# Patient Record
Sex: MALE | Race: BLACK OR AFRICAN AMERICAN | NOT HISPANIC OR LATINO | Employment: STUDENT | URBAN - METROPOLITAN AREA
[De-identification: names, ages, dates, MRNs, and addresses within clinical notes are randomized per-mention and may not be internally consistent; named-entity substitution may affect disease eponyms.]

---
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
## 2023-12-18 ENCOUNTER — EVALUATION (OUTPATIENT)
Dept: PHYSICAL THERAPY | Facility: OTHER | Age: 18
End: 2023-12-18
Payer: COMMERCIAL

## 2023-12-18 DIAGNOSIS — Z98.890 S/P ARTHROSCOPIC RECONSTRUCTION OF ACL OF RIGHT KNEE USING QUADRICEPS TENDON AUTOGRAFT: Primary | ICD-10-CM

## 2023-12-18 DIAGNOSIS — Z87.39 S/P ARTHROSCOPIC RECONSTRUCTION OF ACL OF RIGHT KNEE USING QUADRICEPS TENDON AUTOGRAFT: Primary | ICD-10-CM

## 2023-12-18 PROCEDURE — 97163 PT EVAL HIGH COMPLEX 45 MIN: CPT | Performed by: PHYSICAL THERAPIST

## 2023-12-18 NOTE — LETTER
2023    Tony Snow MD  [address] [facility] [address]    Patient: Ifeanyi Shin Jr.   YOB: 2005   Date of Visit: 2023     Encounter Diagnosis     ICD-10-CM    1. S/P arthroscopic reconstruction of ACL of right knee using quadriceps tendon autograft  Z98.890     Z87.39           Dear Dr. Snow:    Thank you for your recent referral of Ifeanyi Shin Jr.. Please review the attached evaluation summary from Ifeanyi's recent visit.     Please verify that you agree with the plan of care by signing the attached order.     If you have any questions or concerns, please do not hesitate to call.     I sincerely appreciate the opportunity to share in the care of one of your patients and hope to have another opportunity to work with you in the near future.       Sincerely,    Shane Roa, PT      Referring Provider:      I certify that I have read the below Plan of Care and certify the need for these services furnished under this plan of treatment while under my care.                    Tony Snow MD  [address] [facility] [address]  Via Fax: [phone]          PT Evaluation   Today's date: 2023  Patient name: Ifeanyi Shin Jr.  : 2005  MRN: [id number]  Referring provider: Tony Snow MD  Dx:   Encounter Diagnosis     ICD-10-CM    1. S/P arthroscopic reconstruction of ACL of left knee using quadriceps tendon autograft  Z98.890     Z87.39         Start Time: 0900  Stop Time: 1000  Total time in clinic (min): 60 minutes    Assessment  Assessment details: Ifeanyi Shin Jr. is a 18 y.o. male who presents with complaints of  S/P arthroscopic reconstruction of ACL of left knee using quadriceps tendon autograft  (primary encounter diagnosis).  No further referral appears necessary at this time based upon examination results.  Patient presents to PT with impaired strength, impaired ROM, decreased flexibility and impaired ability to complete  IADLs.  Prognosis is good given HEP compliance and PT 2-3x/wk.  Positive prognostic indicators include positive attitude toward recovery.   Please contact me if you have any questions or recommendations.  Thank you for the opportunity to share in  Ifeanyi's care.       Impairments: abnormal coordination, abnormal muscle firing, abnormal or restricted ROM, activity intolerance, impaired physical strength, lacks appropriate home exercise program, pain with function and poor body mechanics    Symptom irritability: moderateBarriers to therapy: Restricted ROM due to scar tissue   Understanding of Dx/Px/POC: good   Prognosis: good    Goals  Short Term:  Pt will report decreased levels of pain by at least 2 subjective ratings in 4 weeks  Pt will demonstrate improved ROM by at least 10 degrees in 4 weeks  Pt will demonstrate improved strength by 1/2 grade  Long Term:   Pt will be independent in their HEP in 8 weeks  Pt will be be pain free with IADL's  Pt will demonstrate improved FOTO, > 80         Plan  Plan details: Prognosis above is given PT services 2x/week tapering to 1x/week over the next 3 months and home program adherence.  Patient would benefit from: skilled physical therapy  Planned modality interventions: thermotherapy: hydrocollator packs, electrical stimulation/Russian stimulation, cryotherapy and low level laser therapy  Planned therapy interventions: activity modification, joint mobilization, manual therapy, motor coordination training, neuromuscular re-education, patient education, self care, therapeutic activities, therapeutic exercise, graded activity, home exercise program, abdominal trunk stabilization, balance, IASTM, flexibility, functional ROM exercises, strengthening and stretching  Frequency: 2x week  Duration in weeks: 12  Plan of Care beginning date: 12/18/2023  Plan of Care expiration date: 3/18/2024  Treatment plan discussed with: patient      Subjective Evaluation    History of Present  "Illness  Mechanism of injury: Patient was playing football when he intercepted a pass and sustained a non-contact knee injury.  He underwent ACL Reconstruction on 10/6/2023.  Patient presents to PT with impaired ROM.  Physician told patient that it is because he has a lot of scar tissue.  He told patient to continue to work on this ROM.  He also told him to find a doctor in the [facility] even though he did his surgery.    Pain   R Knee   Current 0/10  At Best 0/10  At Worst 4/10  Patient described the pain as a tightness accompanied by brief stabbing sensations.    AGGS: stretching   EASES: rest, stretching   Patient's Goal: \"I want to play football in college.\"       Objective     General Comments:      Knee Comments  LQS: WNL     Palpation: unremarkable     Observation: surgical site has healed nicely     ROM   PROM   R Knee WNL   L Knee:   Flexion 95*   Extension 0*     Strength   Iliopsoas L 3-/5 R 3-/5   ER L 3-/5 R 5/5   IR L 3-/5 R 5/5   PGM L 3-/5 R 4/5  Glute Max L 3-/5 R 3-/5   Quads L 3+/5 R 5/5   Hamstrings L 3+/5 R 5/5     Girth Measurements:   L   6 inches above 46 cm   Midline 39 cm   6 inches below 35 cm   R   6 inches above 50 cm   Midline 36 cm   6 inches below 36 cm     Precautions: history of L ACL Reconstruction on 10/6/2023    Daily Treatment Log  Manuals             GISTM              Patellar Mobs             Tibial Glides                           Neuro Re-Ed             NMES QS             NMES TC             BFR                                                                  Ther Ex             Wall Slides              Heel Slides             Bike AAROM              EOT Knee Flexion                                                                 Ther Activity                                       Gait Training                                       Modalities                                                            "

## 2023-12-18 NOTE — PROGRESS NOTES
PT Evaluation   Today's date: 2023  Patient name: Ifeanyi Shin Jr.  : 2005  MRN: [id number]  Referring provider: Tony Snow MD  Dx:   Encounter Diagnosis     ICD-10-CM    1. S/P arthroscopic reconstruction of ACL of left knee using quadriceps tendon autograft  Z98.890     Z87.39         Start Time: 0900  Stop Time: 1000  Total time in clinic (min): 60 minutes    Assessment  Assessment details: Ifeanyi Shin Jr. is a 18 y.o. male who presents with complaints of  S/P arthroscopic reconstruction of ACL of left knee using quadriceps tendon autograft  (primary encounter diagnosis).  No further referral appears necessary at this time based upon examination results.  Patient presents to PT with impaired strength, impaired ROM, decreased flexibility and impaired ability to complete IADLs.  Prognosis is good given HEP compliance and PT 2-3x/wk.  Positive prognostic indicators include positive attitude toward recovery.   Please contact me if you have any questions or recommendations.  Thank you for the opportunity to share in  Ifeanyi's care.       Impairments: abnormal coordination, abnormal muscle firing, abnormal or restricted ROM, activity intolerance, impaired physical strength, lacks appropriate home exercise program, pain with function and poor body mechanics    Symptom irritability: moderateBarriers to therapy: Restricted ROM due to scar tissue   Understanding of Dx/Px/POC: good   Prognosis: good    Goals  Short Term:  Pt will report decreased levels of pain by at least 2 subjective ratings in 4 weeks  Pt will demonstrate improved ROM by at least 10 degrees in 4 weeks  Pt will demonstrate improved strength by 1/2 grade  Long Term:   Pt will be independent in their HEP in 8 weeks  Pt will be be pain free with IADL's  Pt will demonstrate improved FOTO, > 80         Plan  Plan details: Prognosis above is given PT services 2x/week tapering to 1x/week over the next 3 months and home program  "adherence.  Patient would benefit from: skilled physical therapy  Planned modality interventions: thermotherapy: hydrocollator packs, electrical stimulation/Russian stimulation, cryotherapy and low level laser therapy  Planned therapy interventions: activity modification, joint mobilization, manual therapy, motor coordination training, neuromuscular re-education, patient education, self care, therapeutic activities, therapeutic exercise, graded activity, home exercise program, abdominal trunk stabilization, balance, IASTM, flexibility, functional ROM exercises, strengthening and stretching  Frequency: 2x week  Duration in weeks: 12  Plan of Care beginning date: 12/18/2023  Plan of Care expiration date: 3/18/2024  Treatment plan discussed with: patient      Subjective Evaluation    History of Present Illness  Mechanism of injury: Patient was playing football when he intercepted a pass and sustained a non-contact knee injury.  He underwent ACL Reconstruction on 10/6/2023.  Patient presents to PT with impaired ROM.  Physician told patient that it is because he has a lot of scar tissue.  He told patient to continue to work on this ROM.  He also told him to find a doctor in the [facility] even though he did his surgery.    Pain   R Knee   Current 0/10  At Best 0/10  At Worst 4/10  Patient described the pain as a tightness accompanied by brief stabbing sensations.    AGGS: stretching   EASES: rest, stretching   Patient's Goal: \"I want to play football in college.\"       Objective     General Comments:      Knee Comments  LQS: WNL     Palpation: unremarkable     Observation: surgical site has healed nicely     ROM   PROM   R Knee WNL   L Knee:   Flexion 95*   Extension 0*     Strength   Iliopsoas L 3-/5 R 3-/5   ER L 3-/5 R 5/5   IR L 3-/5 R 5/5   PGM L 3-/5 R 4/5  Glute Max L 3-/5 R 3-/5   Quads L 3+/5 R 5/5   Hamstrings L 3+/5 R 5/5     Girth Measurements:   L   6 inches above 46 cm   Midline 39 cm   6 inches below 35 " cm   R   6 inches above 50 cm   Midline 36 cm   6 inches below 36 cm     Precautions: history of L ACL Reconstruction on 10/6/2023    Daily Treatment Log  Manuals             GISTM              Patellar Mobs             Tibial Glides                           Neuro Re-Ed             NMES QS             NMES TC             BFR                                                                  Ther Ex             Wall Slides              Heel Slides             Bike AAROM              EOT Knee Flexion                                                                 Ther Activity                                       Gait Training                                       Modalities

## 2023-12-19 ENCOUNTER — OFFICE VISIT (OUTPATIENT)
Dept: PHYSICAL THERAPY | Facility: OTHER | Age: 18
End: 2023-12-19
Payer: COMMERCIAL

## 2023-12-19 DIAGNOSIS — Z87.39 S/P ARTHROSCOPIC RECONSTRUCTION OF ACL OF RIGHT KNEE USING QUADRICEPS TENDON AUTOGRAFT: Primary | ICD-10-CM

## 2023-12-19 DIAGNOSIS — Z98.890 S/P ARTHROSCOPIC RECONSTRUCTION OF ACL OF RIGHT KNEE USING QUADRICEPS TENDON AUTOGRAFT: Primary | ICD-10-CM

## 2023-12-19 PROCEDURE — 97110 THERAPEUTIC EXERCISES: CPT | Performed by: PHYSICAL THERAPIST

## 2023-12-19 PROCEDURE — 97112 NEUROMUSCULAR REEDUCATION: CPT | Performed by: PHYSICAL THERAPIST

## 2023-12-19 PROCEDURE — 97140 MANUAL THERAPY 1/> REGIONS: CPT | Performed by: PHYSICAL THERAPIST

## 2023-12-20 ENCOUNTER — OFFICE VISIT (OUTPATIENT)
Dept: PHYSICAL THERAPY | Facility: OTHER | Age: 18
End: 2023-12-20
Payer: COMMERCIAL

## 2023-12-20 DIAGNOSIS — Z87.39 S/P ARTHROSCOPIC RECONSTRUCTION OF ACL OF RIGHT KNEE USING QUADRICEPS TENDON AUTOGRAFT: Primary | ICD-10-CM

## 2023-12-20 DIAGNOSIS — Z98.890 S/P ARTHROSCOPIC RECONSTRUCTION OF ACL OF RIGHT KNEE USING QUADRICEPS TENDON AUTOGRAFT: Primary | ICD-10-CM

## 2023-12-20 PROCEDURE — 97110 THERAPEUTIC EXERCISES: CPT | Performed by: PHYSICAL THERAPIST

## 2023-12-20 PROCEDURE — 97112 NEUROMUSCULAR REEDUCATION: CPT | Performed by: PHYSICAL THERAPIST

## 2023-12-20 PROCEDURE — 97140 MANUAL THERAPY 1/> REGIONS: CPT | Performed by: PHYSICAL THERAPIST

## 2023-12-21 ENCOUNTER — APPOINTMENT (OUTPATIENT)
Dept: PHYSICAL THERAPY | Facility: OTHER | Age: 18
End: 2023-12-21
Payer: COMMERCIAL

## 2023-12-23 NOTE — PROGRESS NOTES
"Daily Note   Today's date: 2023  Patient name: Ifeanyi Shin Jr.  : 2005  MRN: [id number]  Referring provider: Tony Snow MD  Dx:   Encounter Diagnosis     ICD-10-CM    1. S/P arthroscopic reconstruction of ACL of left knee using quadriceps tendon autograft  Z98.890     Z87.39         Start Time: 1030  Stop Time: 1130  Total time in clinic (min): 60 minutes    Subjective: Patient tolerated today's session very well.  He said that his knee felt looser after the Initial Evaluation.  Patient reported increased soreness in the knee at the conclusion of today's session.      Objective: See treatment diary below    Assessment: Tolerated treatment well. PT is focusing on ROM.  His ROM was 85*.  At the end of today's session, ROM was 100*.  Patient demonstrated fatigue post treatment, exhibited good technique with therapeutic exercises, and would benefit from continued PT    Plan: Continue per plan of care.     Precautions: ACL Reconstruction performed on 10/6     Daily Treatment Log  Manuals        Tib Glides [facility]       GISTM  [facility]               Neuro Re-Ed         Wall Sits 10 x 10\"        BFR  Future Sessions                                               Ther Ex        Bike  10'        Wall Slides 30x10\"       Wall Slides Weighted 10'   2 KB #25 w/ TB        Heel Prop 10' MHP       EOT Flexion Stretch  10' MHP                                Ther Activity                        Gait Training                        Modalities                             "

## 2023-12-24 NOTE — PROGRESS NOTES
"Daily Note   Today's date: 2023  Patient name: Ifeanyi Shin Jr.  : 2005  MRN: [id number]  Referring provider: Tony Snow MD  Dx:   Encounter Diagnosis     ICD-10-CM    1. S/P arthroscopic reconstruction of ACL of left knee using quadriceps tendon autograft  Z98.890     Z87.39           Start Time: 0845  Stop Time: 930  Total time in clinic (min): 45 minutes    Subjective: Patient tolerated today's session very well.  He told PT his knee was exhausted after today's session.      Objective: See treatment diary below    Assessment: Tolerated treatment well. PT is focusing on ROM.  ROM today was 85* at the beginning of today's session.  It improved to 100* at the end of today's session.  Patient demonstrated fatigue post treatment, exhibited good technique with therapeutic exercises, and would benefit from continued PT    Plan: Continue per plan of care.     Precautions: ACL Reconstruction performed on 10/6     Daily Treatment Log  Manuals       Tib Glides [facility]      GISTM  [facility]      [facility]              Neuro Re-Ed        Wall Sits 10 x 10\"  10x10\"      BFR  Future Sessions Future Sessions                                              Ther Ex       Bike  10'  10'      Wall Slides 30x10\" 30x10\"       Wall Slides Weighted 10'   2 KB #25 w/ TB  10'  2 KB #25 w/ TB      Heel Prop 10' MHP 10' MHP      EOT Flexion Stretch  10' MHP  10' MHP                               Ther Activity                        Gait Training                        Modalities                             "

## 2023-12-26 ENCOUNTER — OFFICE VISIT (OUTPATIENT)
Dept: PHYSICAL THERAPY | Facility: OTHER | Age: 18
End: 2023-12-26

## 2023-12-26 DIAGNOSIS — Z87.39 S/P ARTHROSCOPIC RECONSTRUCTION OF ACL OF RIGHT KNEE USING QUADRICEPS TENDON AUTOGRAFT: Primary | ICD-10-CM

## 2023-12-26 DIAGNOSIS — Z98.890 S/P ARTHROSCOPIC RECONSTRUCTION OF ACL OF RIGHT KNEE USING QUADRICEPS TENDON AUTOGRAFT: Primary | ICD-10-CM

## 2023-12-26 PROCEDURE — 97110 THERAPEUTIC EXERCISES: CPT | Performed by: PHYSICAL THERAPIST

## 2023-12-26 PROCEDURE — 97140 MANUAL THERAPY 1/> REGIONS: CPT | Performed by: PHYSICAL THERAPIST

## 2023-12-27 ENCOUNTER — APPOINTMENT (OUTPATIENT)
Dept: PHYSICAL THERAPY | Facility: OTHER | Age: 18
End: 2023-12-27
Payer: COMMERCIAL

## 2023-12-28 ENCOUNTER — OFFICE VISIT (OUTPATIENT)
Dept: PHYSICAL THERAPY | Facility: OTHER | Age: 18
End: 2023-12-28

## 2023-12-28 DIAGNOSIS — Z98.890 S/P ARTHROSCOPIC RECONSTRUCTION OF ACL OF RIGHT KNEE USING QUADRICEPS TENDON AUTOGRAFT: Primary | ICD-10-CM

## 2023-12-28 DIAGNOSIS — Z87.39 S/P ARTHROSCOPIC RECONSTRUCTION OF ACL OF RIGHT KNEE USING QUADRICEPS TENDON AUTOGRAFT: Primary | ICD-10-CM

## 2023-12-28 PROCEDURE — 97140 MANUAL THERAPY 1/> REGIONS: CPT | Performed by: PHYSICAL THERAPIST

## 2023-12-28 PROCEDURE — 97110 THERAPEUTIC EXERCISES: CPT | Performed by: PHYSICAL THERAPIST

## 2023-12-29 ENCOUNTER — OFFICE VISIT (OUTPATIENT)
Dept: OBGYN CLINIC | Facility: CLINIC | Age: 18
End: 2023-12-29
Payer: COMMERCIAL

## 2023-12-29 ENCOUNTER — APPOINTMENT (OUTPATIENT)
Dept: RADIOLOGY | Age: 18
End: 2023-12-29
Payer: COMMERCIAL

## 2023-12-29 VITALS
SYSTOLIC BLOOD PRESSURE: 115 MMHG | HEART RATE: 88 BPM | DIASTOLIC BLOOD PRESSURE: 75 MMHG | HEIGHT: 70 IN | BODY MASS INDEX: 29.63 KG/M2 | WEIGHT: 207 LBS

## 2023-12-29 DIAGNOSIS — Z98.890 STATUS POST ANTERIOR CRUCIATE LIGAMENT SURGERY: ICD-10-CM

## 2023-12-29 DIAGNOSIS — Z98.890 STATUS POST ANTERIOR CRUCIATE LIGAMENT SURGERY: Primary | ICD-10-CM

## 2023-12-29 PROCEDURE — 73562 X-RAY EXAM OF KNEE 3: CPT

## 2023-12-29 PROCEDURE — 99203 OFFICE O/P NEW LOW 30 MIN: CPT | Performed by: ORTHOPAEDIC SURGERY

## 2023-12-29 NOTE — PROGRESS NOTES
CHIEF COMPLAINT/REASON FOR VISIT  Chief Complaint   Patient presents with    Left Knee - Pain        HISTORY OF PRESENT ILLNESS  Ifeanyi Shin Jr. is a 18 y.o. male who presents for evaluation of his left knee. Patient said on October 6th, 2023 he has ACL and meniscus surgery. Patient said since the surgery he has been struggling to get back his ROM. He said the knee feels stiff and tight. He said the farthest he can get is 90 degrees. Mom said patient has a limp when he walks. Patient reports some mild pain when walking. He stopped using a brace to walk on 12/14/23. He denies any instability in the joint. He ha been working with PT. 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    REVIEW OF SYSTEMS  Review of systems was performed and, outside that mentioned in the HPI, it was negative for symptomology related to the integumentary, hematologic, immunologic, allergic, neurologic, cardiovascular, respiratory, GI or  systems.    MEDICAL HISTORY  Patient Active Problem List   Diagnosis    Exposure to COVID-19 virus       SURGICAL HISTORY  History reviewed. No pertinent surgical history.    CURRENT MEDICATIONS    Current Outpatient Medications:     ibuprofen (MOTRIN) 400 mg tablet, Take 1 tablet (400 mg total) by mouth every 6 (six) hours as needed for mild pain for up to 3 days, Disp: 12 tablet, Rfl: 0    SOCIAL HISTORY  Social History     Socioeconomic History    Marital status: Single     Spouse name: Not on file    Number of children: Not on file    Years of education: Not on file    Highest education level: Not on file   Occupational History    Not on file   Tobacco Use    Smoking status: Never    Smokeless tobacco: Never   Vaping Use    Vaping status: Never Used   Substance and Sexual Activity    Alcohol use: Not Currently    Drug use: Not Currently    Sexual activity: Not Currently   Other Topics Concern    Not on file   Social History Narrative    Not on file     Social Determinants of Health     Financial  "Resource Strain: Not on file   Food Insecurity: Not on file   Transportation Needs: Not on file   Physical Activity: Not on file   Stress: Not on file   Social Connections: Not on file   Intimate Partner Violence: Not on file   Housing Stability: Not on file       Objective     VITAL SIGNS  /75 (BP Location: Left arm, Patient Position: Sitting, Cuff Size: Adult)   Pulse 88   Ht 5' 10\" (1.778 m) Comment: verbal  Wt 93.9 kg (207 lb)   BMI 29.70 kg/m²      PHYSICAL EXAMINATION    Musculoskeletal: Left Knee Examination:  General: The patient is alert, oriented, and pleasant to interact with.  Patient ambulates with Normal and Antalgic gait pattern  Assistive Device: No  Alignment: normal  Skin is warm and dry to touch with no signs of erythema, ecchymosis, or infection   Effusion: minimal  ROM: 0° - 90°  verus 0° - 135° on contralateral side  MMT: 5/5 throughout  TTP: None  Flexor and extensor mechanisms are intact   Knee is stable to varus and valgus stress  Du's Test Negative    Lachman's Test - 1A  Quadriceps atrophy on left quadriceps  2+ DP and PT pulses with brisk capillary refill to the toes  Sural, saphenous, tibial, superficial, and deep peroneal motor and sensory distributions intact  Sensation light touch intact distally    RADIOGRAPHIC EXAMINATION/DIAGNOSTICS:  No results found.    ASSESSMENT/PLAN:  Left knee stiffness status post ACL surgery on 10/6/23    Today, we discussed recovery period from ACL surgery. We discussed he appears to be recovering slightly slower but on track since the surgery. We discussed conservative treatment options including but are certainly not limited to: Rest, ice, compression, elevation, activity modification, anti-inflammatory medication, physical therapy, and/or injections.  We discussed benefits of each potential treatment option.  After discussion, patient was agreeable to trying Rest, Ice, Compression, Elevation, Activity Modification, and Anti-inflammatory " Medication. We discussed we would not recommend any surgical manipulations at this time. We will plan to follow up with the patient in 6 weeks.  They are understanding that if the pain should worsen or they develop new symptoms to please reach out to us sooner. Patient is understanding and agreeable to this plan.           Scribe Attestation      I,:  Bong Han PA-C am acting as a scribe while in the presence of the attending physician.:       I,:  Deo Alcantar MD personally performed the services described in this documentation    as scribed in my presence.:

## 2023-12-30 NOTE — PROGRESS NOTES
"Daily Note   Today's date: 2023  Patient name: Ifeanyi Shin Jr.  : 2005  MRN: [id number]  Referring provider: Tony Snow MD  Dx:   Encounter Diagnosis     ICD-10-CM    1. S/P arthroscopic reconstruction of ACL of left knee using quadriceps tendon autograft  Z98.890     Z87.39           Start Time: 1500  Stop Time: 1600  Total time in clinic (min): 60 minutes    Subjective: Patient tolerated today's session very well.  He told PT his knee was exhausted after today's session.      Objective: See treatment diary below    Assessment: Tolerated treatment well. PT's focus continues to be ROM.  Patient demonstrated fatigue post treatment, exhibited good technique with therapeutic exercises, and would benefit from continued PT    Plan: Continue per plan of care.     Precautions: ACL Reconstruction performed on 10/6     Daily Treatment Log  Manuals      Tib Glides [facility]     GISTM  [facility]             Neuro Re-Ed       Wall Sits 10 x 10\"  10x10\" 10x10\"      BFR  Future Sessions Future Sessions Future Sessions                                             Ther Ex      Bike  10'  10' 10'     Wall Slides 30x10\" 30x10\"  30x10\"      Wall Slides Weighted 10'   2 KB #25 w/ TB  10'  2 KB #25 w/ TB 10'   2KB #25 w/ TB     Heel Prop 10' MHP 10' MHP 10' MHP      EOT Flexion Stretch  10' MHP  10' MHP  10' MHP                              Ther Activity                        Gait Training                        Modalities                             "

## 2024-01-02 ENCOUNTER — OFFICE VISIT (OUTPATIENT)
Dept: PHYSICAL THERAPY | Facility: OTHER | Age: 19
End: 2024-01-02
Payer: COMMERCIAL

## 2024-01-02 DIAGNOSIS — Z98.890 S/P ARTHROSCOPIC RECONSTRUCTION OF ACL OF RIGHT KNEE USING QUADRICEPS TENDON AUTOGRAFT: Primary | ICD-10-CM

## 2024-01-02 DIAGNOSIS — Z87.39 S/P ARTHROSCOPIC RECONSTRUCTION OF ACL OF RIGHT KNEE USING QUADRICEPS TENDON AUTOGRAFT: Primary | ICD-10-CM

## 2024-01-02 PROCEDURE — 97110 THERAPEUTIC EXERCISES: CPT | Performed by: PHYSICAL THERAPIST

## 2024-01-02 PROCEDURE — 97112 NEUROMUSCULAR REEDUCATION: CPT | Performed by: PHYSICAL THERAPIST

## 2024-01-02 PROCEDURE — 97140 MANUAL THERAPY 1/> REGIONS: CPT | Performed by: PHYSICAL THERAPIST

## 2024-01-03 ENCOUNTER — OFFICE VISIT (OUTPATIENT)
Dept: PHYSICAL THERAPY | Facility: OTHER | Age: 19
End: 2024-01-03
Payer: COMMERCIAL

## 2024-01-03 DIAGNOSIS — Z98.890 S/P ARTHROSCOPIC RECONSTRUCTION OF ACL OF RIGHT KNEE USING QUADRICEPS TENDON AUTOGRAFT: Primary | ICD-10-CM

## 2024-01-03 DIAGNOSIS — Z87.39 S/P ARTHROSCOPIC RECONSTRUCTION OF ACL OF RIGHT KNEE USING QUADRICEPS TENDON AUTOGRAFT: Primary | ICD-10-CM

## 2024-01-03 PROCEDURE — 97140 MANUAL THERAPY 1/> REGIONS: CPT | Performed by: PHYSICAL THERAPIST

## 2024-01-03 NOTE — PROGRESS NOTES
"Daily Note   Today's date: 2024  Patient name: Ifeanyi Shin Jr.  : 2005  MRN: [id number]  Referring provider: Tony Snow MD  Dx:   Encounter Diagnosis     ICD-10-CM    1. S/P arthroscopic reconstruction of ACL of left knee using quadriceps tendon autograft  Z98.890     Z87.39         1 on 1 [id number]  IEP [id number]  Start Time: 1530  Stop Time: 1615  Total time in clinic (min): 45 minutes    Subjective: Patient tolerated today's session very well.  He was exhausted at the conclusion of today's session.  Patient said that his physician told him to continue with PT at this time and that it is a little early to start thinking manipulation.  He said that it is still in the realm of possibilities.      Objective: See treatment diary below    Assessment: Tolerated treatment well. PT's focus continues to be ROM.  Patient demonstrated fatigue post treatment, exhibited good technique with therapeutic exercises, and would benefit from continued PT    Plan: Continue per plan of care.     Precautions: ACL Reconstruction performed on 10/6     Daily Treatment Log  Manuals    Tib Glides [facility]   GISTM  [facility]   MFDc  [facility]           Neuro Re-Ed   1   Wall Sits 10 x 10\"  10x10\" 10x10\"  10 x 10\"  10 x 10\"   BFR  Future Sessions Future Sessions Future Sessions Future Sessions Future Sessions                                           Ther Ex  12   Bike  10'  10' 10' 10'  10'   Wall Slides 30x10\" 30x10\"  30x10\"  30x10\" 30x10\"   Wall Slides Weighted 10'   2 KB #25 w/ TB  10'  2 KB #25 w/ TB 10'   2KB #25 w/ TB 10'  2KB #25 w/ TB 10'   2KB #25 w/ TB   Heel Prop 10' MHP 10' MHP 10' MHP  10' MHP 10' MHP    EOT Flexion Stretch  10' MHP  10' MHP  10' MHP  10' MHP 10' MHP                            Ther Activity                        Gait Training                        Modalities                         "

## 2024-01-04 ENCOUNTER — OFFICE VISIT (OUTPATIENT)
Dept: PHYSICAL THERAPY | Facility: OTHER | Age: 19
End: 2024-01-04
Payer: COMMERCIAL

## 2024-01-04 DIAGNOSIS — Z87.39 S/P ARTHROSCOPIC RECONSTRUCTION OF ACL OF RIGHT KNEE USING QUADRICEPS TENDON AUTOGRAFT: Primary | ICD-10-CM

## 2024-01-04 DIAGNOSIS — Z98.890 S/P ARTHROSCOPIC RECONSTRUCTION OF ACL OF RIGHT KNEE USING QUADRICEPS TENDON AUTOGRAFT: Primary | ICD-10-CM

## 2024-01-04 PROCEDURE — 97140 MANUAL THERAPY 1/> REGIONS: CPT | Performed by: PHYSICAL THERAPIST

## 2024-01-04 PROCEDURE — 97110 THERAPEUTIC EXERCISES: CPT | Performed by: PHYSICAL THERAPIST

## 2024-01-04 PROCEDURE — 97112 NEUROMUSCULAR REEDUCATION: CPT | Performed by: PHYSICAL THERAPIST

## 2024-01-05 NOTE — PROGRESS NOTES
"Daily Note   Today's date: 1/3/2024  Patient name: Ifeanyi Shin Jr.  : 2005  MRN: [id number]  Referring provider: Tony Snow MD  Dx:   Encounter Diagnosis     ICD-10-CM    1. S/P arthroscopic reconstruction of ACL of left knee using quadriceps tendon autograft  Z98.890     Z87.39         IEP [id number]  1 on 1 [id number]  IEP 5980-4648  Start Time: 1300  Stop Time: 1400  Total time in clinic (min): 60 minutes    Subjective: Patient tolerated today's session very well.  He said that the knee feels looser at the end of today's session.    Objective: See treatment diary below    Assessment: Tolerated treatment well. PT's focus continues to be ROM.  PT added a couple of new exercises to strengthen his core.  Patient demonstrated fatigue post treatment, exhibited good technique with therapeutic exercises, and would benefit from continued PT    Plan: Continue per plan of care.     Precautions: ACL Reconstruction performed on 10/6     Daily Treatment Log  Manuals 12/19 12/20 12/26 12/28 1/2 1/3   Tib Glides [facility]   [facility]M  [facility]   [facility]            Neuro Re-Ed  12/19 12/20 12/26 12/28 1/2 1/3   Wall Sits 10 x 10\"  10x10\" 10x10\"  10 x 10\"  10 x 10\" 10 x 10\"    BFR  Future Sessions Future Sessions Future Sessions Future Sessions Future Sessions    Pball Crushers      20 x 5\"    Alt UE/Alt LE                                    Ther Ex 12/19 12/20 12/26 12/28 1/2 1/3   Bike  10'  10' 10' 10'  10' 10'    Wall Slides 30x10\" 30x10\"  30x10\"  30x10\" 30x10\" 30 x 10\"    Wall Slides Weighted 10'   2 KB #25 w/ TB  10'  2 KB #25 w/ TB 10'   2KB #25 w/ TB 10'  2KB #25 w/ TB 10'   2KB #25 w/ TB 10'   2KB #25 w/ TB   Heel Prop 10' MHP 10' MHP 10' MHP  10' MHP 10' MHP  10' MHP   EOT Flexion Stretch  10' MHP  10' MHP  10' MHP  10' MHP 10' MHP  10' MHP                              Ther Activity                           Gait Training                           Modalities "

## 2024-01-06 NOTE — PROGRESS NOTES
"Daily Note   Today's date: 2024  Patient name: Ifeanyi Shin Jr.  : 2005  MRN: [id number]  Referring provider: Tony Snow MD  Dx:   Encounter Diagnosis     ICD-10-CM    1. S/P arthroscopic reconstruction of ACL of left knee using quadriceps tendon autograft  Z98.890     Z87.39         IEP [id number]  1 on 1 0194-0544  IEP 0560-4394  Start Time: 1115  Stop Time: 1215  Total time in clinic (min): 60 minutes    Subjective: Patient tolerated today's session very well.  He reported decreased tightness after heat and stretching.      Objective: See treatment diary below    Assessment: Tolerated treatment well. PT's focus continues to be ROM.  PT added several new core exercises.  PT also emphasized importance of doing exercises at home.  Patient demonstrated fatigue post treatment, exhibited good technique with therapeutic exercises, and would benefit from continued PT    Plan: Continue per plan of care.     Precautions: ACL Reconstruction performed on 10/6     Daily Treatment Log  Manuals 12/26 12/28 1/2 1/3 1/4   Tib Glides [facility]   GISTM  [facility]   [facility]   PROM     [facility]   Neuro Re-Ed  12/26 12/28 1/2 1/3 1/4   Wall Sits 10x10\"  10 x 10\"  10 x 10\" 10 x 10\"  10 x 10\"    BFR  Future Sessions Future Sessions Future Sessions     Pball Crushers    20 x 5\"  20 x 5\"    Alt UE/Alt LE     20 x 5\" ea  #2AW/#2   Clamshells     #5AW   20 x 5\" ea                   Ther Ex 12/26 12/28 1/2 1/3 1/4   Bike  10' 10'  10' 10'  10'    Wall Slides 30x10\"  30x10\" 30x10\" 30 x 10\"  30x10\"    Wall Slides Weighted 10'   2KB #25 w/ TB 10'  2KB #25 w/ TB 10'   2KB #25 w/ TB 10'   2KB #25 w/ TB 10'   2KB #25 w/ TB   Heel Prop 10' MHP  10' MHP 10' MHP  10' MHP 10' MHP   EOT Flexion Stretch  10' MHP  10' MHP 10' MHP  10' MHP 10' MHP                           Ther Activity                        Gait Training                        Modalities                             "

## 2024-01-08 ENCOUNTER — OFFICE VISIT (OUTPATIENT)
Dept: PHYSICAL THERAPY | Facility: OTHER | Age: 19
End: 2024-01-08
Payer: COMMERCIAL

## 2024-01-08 DIAGNOSIS — Z87.39 S/P ARTHROSCOPIC RECONSTRUCTION OF ACL OF RIGHT KNEE USING QUADRICEPS TENDON AUTOGRAFT: Primary | ICD-10-CM

## 2024-01-08 DIAGNOSIS — Z98.890 S/P ARTHROSCOPIC RECONSTRUCTION OF ACL OF RIGHT KNEE USING QUADRICEPS TENDON AUTOGRAFT: Primary | ICD-10-CM

## 2024-01-08 PROCEDURE — 97110 THERAPEUTIC EXERCISES: CPT | Performed by: PHYSICAL THERAPIST

## 2024-01-08 PROCEDURE — 97112 NEUROMUSCULAR REEDUCATION: CPT | Performed by: PHYSICAL THERAPIST

## 2024-01-08 PROCEDURE — 97140 MANUAL THERAPY 1/> REGIONS: CPT | Performed by: PHYSICAL THERAPIST

## 2024-01-08 NOTE — PROGRESS NOTES
"Daily Note   Today's date: 2024  Patient name: Ifeanyi Shin Jr.  : 2005  MRN: [id number]  Referring provider: Tony Snow MD  Dx:   Encounter Diagnosis     ICD-10-CM    1. S/P arthroscopic reconstruction of ACL of left knee using quadriceps tendon autograft  Z98.890     Z87.39         1 on 1 [id number]  IEP [id number]  Start Time: 1400  Stop Time: 1500  Total time in clinic (min): 60 minutes    Subjective: Patient tolerated today's session very well.  PT is reaching out to MD about Dynasplint.      Objective: See treatment diary below    Assessment: Tolerated treatment well.  NV try BFR.  Patient demonstrated fatigue post treatment, exhibited good technique with therapeutic exercises, and would benefit from continued PT    Plan: Continue per plan of care.     Precautions: ACL Reconstruction performed on 10/6     Daily Treatment Log  Manuals 12/26 12/28 1/2 1/3 1/4 1/8   Tib Glides [facility]   GISTM  [facility]   [facility]  [facility]   PROM     [facility]   Neuro Re-Ed  12/26 12/28 1/2 1/3 1/4 1/8   Wall Sits 10x10\"  10 x 10\"  10 x 10\" 10 x 10\"  10 x 10\"  10 x 10\"    BFR  Future Sessions Future Sessions Future Sessions   NV    Pball Crushers    20 x 5\"  20 x 5\"  30 x 5\"    Alt UE/Alt LE     20 x 5\" ea  #2AW/#2 20 x 5\" ea   #2AW/#2    Clamshells     #5AW   20 x 5\" ea #5AW   30x5\" ea                     Ther Ex 12/26 12/28 1/2 1/3 1/4 1/8   Bike  10' 10'  10' 10'  10'  10'    Wall Slides 30x10\"  30x10\" 30x10\" 30 x 10\"  30x10\"  30x10\"    Wall Slides Weighted 10'   2KB #25 w/ TB 10'  2KB #25 w/ TB 10'   2KB #25 w/ TB 10'   2KB #25 w/ TB 10'   2KB #25 w/ TB 10'   2KB #25 w/ TB   Heel Prop 10' MHP  10' MHP 10' MHP  10' MHP 10' MHP 10' MHP   EOT Flexion Stretch  10' MHP  10' MHP 10' MHP  10' MHP 10' MHP 10' MHP    Double Leg Press      #75  3x10                      Ther Activity                           Gait Training                           Modalities                   "

## 2024-01-10 ENCOUNTER — OFFICE VISIT (OUTPATIENT)
Dept: PHYSICAL THERAPY | Facility: OTHER | Age: 19
End: 2024-01-10
Payer: COMMERCIAL

## 2024-01-10 DIAGNOSIS — Z98.890 S/P ARTHROSCOPIC RECONSTRUCTION OF ACL OF RIGHT KNEE USING QUADRICEPS TENDON AUTOGRAFT: Primary | ICD-10-CM

## 2024-01-10 DIAGNOSIS — Z87.39 S/P ARTHROSCOPIC RECONSTRUCTION OF ACL OF RIGHT KNEE USING QUADRICEPS TENDON AUTOGRAFT: Primary | ICD-10-CM

## 2024-01-10 PROCEDURE — 97112 NEUROMUSCULAR REEDUCATION: CPT | Performed by: PHYSICAL THERAPIST

## 2024-01-10 PROCEDURE — 97110 THERAPEUTIC EXERCISES: CPT | Performed by: PHYSICAL THERAPIST

## 2024-01-10 PROCEDURE — 97140 MANUAL THERAPY 1/> REGIONS: CPT | Performed by: PHYSICAL THERAPIST

## 2024-01-11 ENCOUNTER — OFFICE VISIT (OUTPATIENT)
Dept: PHYSICAL THERAPY | Facility: OTHER | Age: 19
End: 2024-01-11
Payer: COMMERCIAL

## 2024-01-11 DIAGNOSIS — Z87.39 S/P ARTHROSCOPIC RECONSTRUCTION OF ACL OF RIGHT KNEE USING QUADRICEPS TENDON AUTOGRAFT: Primary | ICD-10-CM

## 2024-01-11 DIAGNOSIS — Z98.890 S/P ARTHROSCOPIC RECONSTRUCTION OF ACL OF RIGHT KNEE USING QUADRICEPS TENDON AUTOGRAFT: Primary | ICD-10-CM

## 2024-01-11 PROCEDURE — 97110 THERAPEUTIC EXERCISES: CPT | Performed by: PHYSICAL THERAPIST

## 2024-01-11 PROCEDURE — 97140 MANUAL THERAPY 1/> REGIONS: CPT | Performed by: PHYSICAL THERAPIST

## 2024-01-11 NOTE — PROGRESS NOTES
"Daily Note   Today's date: 1/10/2024  Patient name: Ifeanyi Shin Jr.  : 2005  MRN: [id number]  Referring provider: Tony Snow MD  Dx:   Encounter Diagnosis     ICD-10-CM    1. S/P arthroscopic reconstruction of ACL of left knee using quadriceps tendon autograft  Z98.890     Z87.39         IEP [id number]  1 on 1 entire duration  Start Time: 1330  Stop Time: 1430  Total time in clinic (min): 60 minutes    Subjective: Patient tolerated today's session very well.  PT reached out to Dr Alcantar.  Awaiting response at this time.      Objective: See treatment diary below    Assessment: Tolerated treatment well.  NV try BFR.  Patient demonstrated fatigue post treatment, exhibited good technique with therapeutic exercises, and would benefit from continued PT    Plan: Continue per plan of care.     Precautions: ACL Reconstruction performed on 10/6     Daily Treatment Log  Manuals 1/2 1/3 1/4 1/8 1/10   Tib Glides [facility]   GISTM  [facility]   MFDc  [facility]   PROM   [facility]   Neuro Re-Ed  1/2 1/3 1/4 1/8 1/10   Wall Sits 10 x 10\" 10 x 10\"  10 x 10\"  10 x 10\"  10 x 10\"    BFR  Future Sessions   NV  NV   Pball Crushers  20 x 5\"  20 x 5\"  30 x 5\"  30x5\"    Alt UE/Alt LE   20 x 5\" ea  #2AW/#2 20 x 5\" ea   #2AW/#2     Clamshells   #5AW   20 x 5\" ea #5AW   30x5\" ea #5AW  30x5\" ea   Heel Raised Wall Squats     10 x 10\"            Ther Ex 1/2 1/3 1/4 1/8 1/10   Bike  10' 10'  10'  10'  10'    Wall Slides 30x10\" 30 x 10\"  30x10\"  30x10\"  30 x 10\"    Wall Slides Weighted 10'   2KB #25 w/ TB 10'   2KB #25 w/ TB 10'   2KB #25 w/ TB 10'   2KB #25 w/ TB    Heel Prop 10' MHP  10' MHP 10' MHP 10' MHP 10' MHP   EOT Flexion Stretch  10' MHP  10' MHP 10' MHP 10' MHP  10' MHP    Double Leg Press    #75  3x10  #75   3x10   Couch Stretch     10 x 10\"            Ther Activity                        Gait Training                        Modalities                          "

## 2024-01-13 NOTE — PROGRESS NOTES
"Daily Note   Today's date: 2024  Patient name: Ifeanyi Shin Jr.  : 2005  MRN: [id number]  Referring provider: Tony Snow MD  Dx:   Encounter Diagnosis     ICD-10-CM    1. S/P arthroscopic reconstruction of ACL of left knee using quadriceps tendon autograft  Z98.890     Z87.39         IEP [id number]  1 on 1 8002-4343  Start Time: 1330  Stop Time: 1415  Total time in clinic (min): 45 minutes    Subjective: Patient tolerated today's session very well.  He reported his knee felt looser.       Objective: See treatment diary below    Assessment: Tolerated treatment well.  PT did not try BFR today; NV try BFR.  Patient demonstrated fatigue post treatment, exhibited good technique with therapeutic exercises, and would benefit from continued PT    Plan: Continue per plan of care.     Precautions: ACL Reconstruction performed on 10/6     Daily Treatment Log  Manuals 1/2 1/3 1/4 1/8 1/10 1/11   Tib Glides [facility]   GISTM  [facility]   MFDc  [facility]   PROM   [facility]   Neuro Re-Ed  1/2 1/3 1/4 1/8 1/10 1/11   Wall Sits 10 x 10\" 10 x 10\"  10 x 10\"  10 x 10\"  10 x 10\"  10 x 10\"    BFR  Future Sessions   NV  NV NV   Pball Crushers  20 x 5\"  20 x 5\"  30 x 5\"  30x5\"  30x5\"    Alt UE/Alt LE   20 x 5\" ea  #2AW/#2 20 x 5\" ea   #2AW/#2   20x5\" ea  #2AW/#2   Clamshells   #5AW   20 x 5\" ea #5AW   30x5\" ea #5AW  30x5\" ea #5AW  30x5\" ea   Heel Raised Wall Squats     10 x 10\"  10 x 10\"             Ther Ex 1/2 1/3 1/4 1/8 1/10 1/10   Bike  10' 10'  10'  10'  10'  10'    Wall Slides 30x10\" 30 x 10\"  30x10\"  30x10\"  30 x 10\"  30x10\"    Wall Slides Weighted 10'   2KB #25 w/ TB 10'   2KB #25 w/ TB 10'   2KB #25 w/ TB 10'   2KB #25 w/ TB     Heel Prop 10' MHP  10' MHP 10' MHP 10' MHP 10' MHP 10' MHP    EOT Flexion Stretch  10' MHP  10' MHP 10' MHP 10' MHP  10' MHP  10' MHP   Double Leg Press    #75  3x10  #75   3x10 #75  3x10   Couch Stretch     10 x 10\"  10 x 10\"           "   Ther Activity                           Gait Training                           Modalities

## 2024-01-15 ENCOUNTER — OFFICE VISIT (OUTPATIENT)
Dept: PHYSICAL THERAPY | Facility: OTHER | Age: 19
End: 2024-01-15
Payer: COMMERCIAL

## 2024-01-15 DIAGNOSIS — Z98.890 S/P ARTHROSCOPIC RECONSTRUCTION OF ACL OF RIGHT KNEE USING QUADRICEPS TENDON AUTOGRAFT: Primary | ICD-10-CM

## 2024-01-15 DIAGNOSIS — Z87.39 S/P ARTHROSCOPIC RECONSTRUCTION OF ACL OF RIGHT KNEE USING QUADRICEPS TENDON AUTOGRAFT: Primary | ICD-10-CM

## 2024-01-15 PROCEDURE — 97110 THERAPEUTIC EXERCISES: CPT | Performed by: PHYSICAL THERAPIST

## 2024-01-15 PROCEDURE — 97112 NEUROMUSCULAR REEDUCATION: CPT | Performed by: PHYSICAL THERAPIST

## 2024-01-15 PROCEDURE — 97140 MANUAL THERAPY 1/> REGIONS: CPT | Performed by: PHYSICAL THERAPIST

## 2024-01-16 NOTE — PROGRESS NOTES
"Daily Note   Today's date: 1/15/2024  Patient name: Ifeanyi Shin Jr.  : 2005  MRN: [id number]  Referring provider: Tony Snow MD  Dx:   Encounter Diagnosis     ICD-10-CM    1. S/P arthroscopic reconstruction of ACL of left knee using quadriceps tendon autograft  Z98.890     Z87.39         IEP 4941-9730  1 on 1 8756-1579  Start Time: 1530  Stop Time: 1630  Total time in clinic (min): 60 minutes    Subjective: Patient tolerated today's session very well.  He reported his knee felt looser.       Objective: See treatment diary below    Assessment: Tolerated treatment well.  PT did not try BFR today.  PT is going to do it next session.  Add BFR next visit.  demonstrated fatigue post treatment, exhibited good technique with therapeutic exercises, and would benefit from continued PT    Plan: Continue per plan of care.     Precautions: ACL Reconstruction performed on 10/6     Daily Treatment Log  Manuals 1/4 1/8 1/10 1/11 1/15   Tib Glides [facility]   GISTM  [facility]   [facility]   PROM [facility]   Neuro Re-Ed  1/4 1/8 1/10 1/11 1/15   Wall Sits 10 x 10\"  10 x 10\"  10 x 10\"  10 x 10\"     BFR   NV  NV NV    Pball Crushers 20 x 5\"  30 x 5\"  30x5\"  30x5\"  30x5\"    Alt UE/Alt LE 20 x 5\" ea  #2AW/#2 20 x 5\" ea   #2AW/#2   20x5\" ea  #2AW/#2 20x5\" ea  #2AW/#2   Clamshells #5AW   20 x 5\" ea #5AW   30x5\" ea #5AW  30x5\" ea #5AW  30x5\" ea #5AW  30x5\" ea   Heel Raised Wall Squats   10 x 10\"  10 x 10\"  10 x 45\"            Ther Ex 1/4 1/8 1/10 1/10 1/15   Bike  10'  10'  10'  10'  10'    Wall Slides 30x10\"  30x10\"  30 x 10\"  30x10\"  10' MHP   Wall Slides Weighted 10'   2KB #25 w/ TB 10'   2KB #25 w/ TB      Heel Prop 10' MHP 10' MHP 10' MHP 10' MHP     EOT Flexion Stretch  10' MHP 10' MHP  10' MHP  10' MHP    Double Leg Press  #75  3x10  #75   3x10 #75  3x10 #75   3 x 10    Couch Stretch   10 x 10\"  10 x 10\"  10 x 10\"            Ther Activity                        Gait " Training                        Modalities

## 2024-01-17 ENCOUNTER — OFFICE VISIT (OUTPATIENT)
Dept: PHYSICAL THERAPY | Facility: OTHER | Age: 19
End: 2024-01-17
Payer: COMMERCIAL

## 2024-01-17 DIAGNOSIS — Z98.890 S/P ARTHROSCOPIC RECONSTRUCTION OF ACL OF RIGHT KNEE USING QUADRICEPS TENDON AUTOGRAFT: Primary | ICD-10-CM

## 2024-01-17 DIAGNOSIS — Z87.39 S/P ARTHROSCOPIC RECONSTRUCTION OF ACL OF RIGHT KNEE USING QUADRICEPS TENDON AUTOGRAFT: Primary | ICD-10-CM

## 2024-01-17 PROCEDURE — 97110 THERAPEUTIC EXERCISES: CPT | Performed by: PHYSICAL THERAPIST

## 2024-01-17 PROCEDURE — 97112 NEUROMUSCULAR REEDUCATION: CPT | Performed by: PHYSICAL THERAPIST

## 2024-01-17 PROCEDURE — 97140 MANUAL THERAPY 1/> REGIONS: CPT | Performed by: PHYSICAL THERAPIST

## 2024-01-17 NOTE — PROGRESS NOTES
"Daily Note   Today's date: 24  Patient name: Ifeanyi Shin Jr.  : 2005  MRN: [id number]  Referring provider: Tony Snow MD  Dx:   Encounter Diagnosis     ICD-10-CM    1. S/P arthroscopic reconstruction of ACL of left knee using quadriceps tendon autograft  Z98.890     Z87.39         Start Time: 1130  Stop Time: 1230  Total time in clinic (min): 60 minutes    Subjective: Patient tolerated today's session very well.  He reported his knee felt looser at the end of today's session.        Objective: See treatment diary below    Assessment: Tolerated treatment well.  PT did not try BFR today.  PT is going to do it next session.  Add BFR next visit.  PT was able to get him to 98* of flexion today.  Patient demonstrated fatigue post treatment, exhibited good technique with therapeutic exercises, and would benefit from continued PT    Plan: Continue per plan of care.     Precautions: ACL Reconstruction performed on 10/6     Daily Treatment Log  Manuals 1/10 1/11 1/15 1/17   Tib Glides [facility]   GISTM  [facility]   [facility]   PROM [facility]   Neuro Re-Ed  1/10 1/11 1/15 1/17   Wall Sits 10 x 10\"  10 x 10\"      BFR  NV NV     Pball Crushers 30x5\"  30x5\"  30x5\"     Alt UE/Alt LE  20x5\" ea  #2AW/#2 20x5\" ea  #2AW/#2    Clamshells #5AW  30x5\" ea #5AW  30x5\" ea #5AW  30x5\" ea    Heel Raised Wall Squats 10 x 10\"  10 x 10\"  10 x 45\"  10 x 45\"   TKE [place]    #30 3x10x5\" ea          Ther Ex 1/10 1/10 1/15 1/17   Bike  10'  10'  10'  10'    Wall Slides 30 x 10\"  30x10\"  10' MHP 10' MHP   Wall Slides Weighted    #10KB  MHP 10'    Heel Prop 10' MHP 10' MHP      EOT Flexion Stretch  10' MHP  10' MHP     Double Leg Press #75   3x10 #75  3x10 #75   3 x 10  #75  3 x 10   Couch Stretch 10 x 10\"  10 x 10\"  10 x 10\"  10 x 10\"           Ther Activity                     Gait Training                     Modalities                       "

## 2024-01-18 ENCOUNTER — OFFICE VISIT (OUTPATIENT)
Dept: PHYSICAL THERAPY | Facility: OTHER | Age: 19
End: 2024-01-18
Payer: COMMERCIAL

## 2024-01-18 DIAGNOSIS — Z87.39 S/P ARTHROSCOPIC RECONSTRUCTION OF ACL OF RIGHT KNEE USING QUADRICEPS TENDON AUTOGRAFT: Primary | ICD-10-CM

## 2024-01-18 DIAGNOSIS — Z98.890 S/P ARTHROSCOPIC RECONSTRUCTION OF ACL OF RIGHT KNEE USING QUADRICEPS TENDON AUTOGRAFT: Primary | ICD-10-CM

## 2024-01-18 PROCEDURE — 97140 MANUAL THERAPY 1/> REGIONS: CPT | Performed by: PHYSICAL THERAPIST

## 2024-01-21 NOTE — PROGRESS NOTES
"Daily Note   Today's date: 24  Patient name: Ifeanyi Shin Jr.  : 2005  MRN: [id number]  Referring provider: Tony Snow MD  Dx:   Encounter Diagnosis     ICD-10-CM    1. S/P arthroscopic reconstruction of ACL of left knee using quadriceps tendon autograft  Z98.890     Z87.39                    Subjective: Patient tolerated today's session very well.  He reported his knee felt looser at the end of today's session.        Objective: See treatment diary below    Assessment: Tolerated treatment well.  PT did not try BFR today.  PT is going to do it next session.  Add BFR next visit.  PT was able to get him to 98* of flexion today.  Patient demonstrated fatigue post treatment, exhibited good technique with therapeutic exercises, and would benefit from continued PT    Plan: Continue per plan of care.     Precautions: ACL Reconstruction performed on 10/6     Daily Treatment Log  Manuals 1/10 1/11 1/15 1/17   Tib Glides [facility]   [facility]ISTM  [facility]   [facility]   PROM [facility]   Neuro Re-Ed  1/10 1/11 1/15 1/17   Wall Sits 10 x 10\"  10 x 10\"      BFR  NV NV     Pball Crushers 30x5\"  30x5\"  30x5\"     Alt UE/Alt LE  20x5\" ea  #2AW/#2 20x5\" ea  #2AW/#2    Clamshells #5AW  30x5\" ea #5AW  30x5\" ea #5AW  30x5\" ea    Heel Raised Wall Squats 10 x 10\"  10 x 10\"  10 x 45\"  10 x 45\"   TKE [place]    #30 3x10x5\" ea          Ther Ex 1/10 1/10 1/15 1/17   Bike  10'  10'  10'  10'    Wall Slides 30 x 10\"  30x10\"  10' MHP 10' MHP   Wall Slides Weighted    #10KB  MHP 10'    Heel Prop 10' MHP 10' MHP      EOT Flexion Stretch  10' MHP  10' MHP     Double Leg Press #75   3x10 #75  3x10 #75   3 x 10  #75  3 x 10   Couch Stretch 10 x 10\"  10 x 10\"  10 x 10\"  10 x 10\"           Ther Activity                     Gait Training                     Modalities                       " Training                        Modalities

## 2024-01-22 ENCOUNTER — OFFICE VISIT (OUTPATIENT)
Dept: PHYSICAL THERAPY | Facility: OTHER | Age: 19
End: 2024-01-22
Payer: COMMERCIAL

## 2024-01-22 DIAGNOSIS — Z87.39 S/P ARTHROSCOPIC RECONSTRUCTION OF ACL OF RIGHT KNEE USING QUADRICEPS TENDON AUTOGRAFT: Primary | ICD-10-CM

## 2024-01-22 DIAGNOSIS — Z98.890 S/P ARTHROSCOPIC RECONSTRUCTION OF ACL OF RIGHT KNEE USING QUADRICEPS TENDON AUTOGRAFT: Primary | ICD-10-CM

## 2024-01-22 PROCEDURE — 97110 THERAPEUTIC EXERCISES: CPT | Performed by: PHYSICAL THERAPIST

## 2024-01-22 PROCEDURE — 97140 MANUAL THERAPY 1/> REGIONS: CPT | Performed by: PHYSICAL THERAPIST

## 2024-01-24 ENCOUNTER — OFFICE VISIT (OUTPATIENT)
Dept: PHYSICAL THERAPY | Facility: OTHER | Age: 19
End: 2024-01-24
Payer: COMMERCIAL

## 2024-01-24 DIAGNOSIS — Z98.890 S/P ARTHROSCOPIC RECONSTRUCTION OF ACL OF RIGHT KNEE USING QUADRICEPS TENDON AUTOGRAFT: Primary | ICD-10-CM

## 2024-01-24 DIAGNOSIS — Z87.39 S/P ARTHROSCOPIC RECONSTRUCTION OF ACL OF RIGHT KNEE USING QUADRICEPS TENDON AUTOGRAFT: Primary | ICD-10-CM

## 2024-01-24 PROCEDURE — 97140 MANUAL THERAPY 1/> REGIONS: CPT | Performed by: PEDIATRICS

## 2024-01-24 PROCEDURE — 97110 THERAPEUTIC EXERCISES: CPT | Performed by: PEDIATRICS

## 2024-01-24 PROCEDURE — 97112 NEUROMUSCULAR REEDUCATION: CPT | Performed by: PEDIATRICS

## 2024-01-24 NOTE — PROGRESS NOTES
"Daily Note   Today's date: 24  Patient name: Ifeanyi Shin Jr.  : 2005  MRN: [id number]  Referring provider: Tony Snow MD  Dx:   Encounter Diagnosis     ICD-10-CM    1. S/P arthroscopic reconstruction of ACL of left knee using quadriceps tendon autograft  Z98.890     Z87.39         1 on 1 entire duration  Start Time: 1215  Stop Time: 1315  Total time in clinic (min): 60 minutes    Subjective: Patient reports he is starting to notice an improvement in his knee.  PT still has not hear from [employer] rep.  PT will reach out to him again.        Objective: See treatment diary below    Assessment: Tolerated treatment well.  PT did not try BFR today.  Try BFR in the near future.  Patient demonstrated fatigue post treatment, exhibited good technique with therapeutic exercises, and would benefit from continued PT    Plan: Continue per plan of care.     Precautions: ACL Reconstruction performed on 10/6     Daily Treatment Log  Manuals 1/10 1/11 1/15 1/17 1/18 1/22   Tib Glides [facility]   GISTM  [facility]   MFDc  [facility]   PROM [facility]   Neuro Re-Ed  1/10 1/11 1/15 1/17 1/18 1/22   Wall Sits 10 x 10\"  10 x 10\"        BFR  NV NV       Pball Crushers 30x5\"  30x5\"  30x5\"    30x5\"    Alt UE/Alt LE  20x5\" ea  #2AW/#2 20x5\" ea  #2AW/#2      Clamshells #5AW  30x5\" ea #5AW  30x5\" ea #5AW  30x5\" ea      Heel Raised Wall Squats 10 x 10\"  10 x 10\"  10 x 45\"  10 x 45\" 10 x 45\"  10 x 45\"    TKE [place]    #30 3x10x5\" ea #30 3x10x5\" ea    Trinidadian Squats     3 x 10 x 5\" ea 3 x 10 x 5\" ea   Ther Ex 1/10 1/10 1/15 1/17 1/18 1/22   Bike  10'  10'  10'  10'  10'  10'    Wall Slides 30 x 10\"  30x10\"  10' MHP 10' MHP 10'    Wall Slides Weighted    #10KB  MHP 10'  #10KB  MHP 10'  #10KB   MHP 10'    Heel Prop 10' MHP 10' MHP        EOT Flexion Stretch  10' MHP  10' MHP       Double Leg Press #75   3x10 #75  3x10 #75   3 x 10  #75  3 x 10 #80   3 x 10 #80  3 x 10 " "  Couch Stretch 10 x 10\"  10 x 10\"  10 x 10\"  10 x 10\"  10 x 10\"  30 x 10\"    Supine Pball HS Curls       30x    Ther Activity                           Gait Training                           Modalities                             "

## 2024-01-25 ENCOUNTER — OFFICE VISIT (OUTPATIENT)
Dept: PHYSICAL THERAPY | Facility: OTHER | Age: 19
End: 2024-01-25
Payer: COMMERCIAL

## 2024-01-25 DIAGNOSIS — Z98.890 S/P ARTHROSCOPIC RECONSTRUCTION OF ACL OF RIGHT KNEE USING QUADRICEPS TENDON AUTOGRAFT: Primary | ICD-10-CM

## 2024-01-25 DIAGNOSIS — Z87.39 S/P ARTHROSCOPIC RECONSTRUCTION OF ACL OF RIGHT KNEE USING QUADRICEPS TENDON AUTOGRAFT: Primary | ICD-10-CM

## 2024-01-25 PROCEDURE — 97112 NEUROMUSCULAR REEDUCATION: CPT | Performed by: PHYSICAL THERAPIST

## 2024-01-26 NOTE — PROGRESS NOTES
"Daily Note   Today's date: 24  Patient name: Ifeanyi Shin Jr.  : 2005  MRN: [id number]  Referring provider: Tony Snow MD  Dx:   Encounter Diagnosis     ICD-10-CM    1. S/P arthroscopic reconstruction of ACL of left knee using quadriceps tendon autograft  Z98.890     Z87.39         1 on 1 entire duration  Start Time: 1515  Stop Time: 1600  Total time in clinic (min): 45 minutes    Subjective: Patient said that his knee is feeling much better.  However his ROM remains limited.  Patient will be seeing MD in the near future.  PT still has not heard from [employer] Rep.  PT will reach out to him to follow up.      Objective: See treatment diary below    Assessment: Tolerated treatment well.  PT did not try BFR today.  Try BFR tomorrow.  Patient demonstrated fatigue post treatment, exhibited good technique with therapeutic exercises, and would benefit from continued PT    Plan: Continue per plan of care.     Precautions: ACL Reconstruction performed on 10/6     Daily Treatment Log  Manuals 1/15 1/17 1/18 1/22 1/24   Tib Glides [facility]   GISTM  [facility]   MFDc  [facility]   PROM [facility]   Neuro Re-Ed  1/15 1/17 1/18 1/22 1/24   Wall Sits        BFR         Pball Crushers 30x5\"    30x5\"     Alt UE/Alt LE 20x5\" ea  #2AW/#2       Clamshells #5AW  30x5\" ea       Heel Raised Wall Squats 10 x 45\"  10 x 45\" 10 x 45\"  10 x 45\"  10 x 45\"    TKE [place]  #30 3x10x5\" ea #30 3x10x5\" ea     Maltese Squats   3 x 10 x 5\" ea 3 x 10 x 5\" ea 3 x 10 x 5\" ea   Ther Ex 1/15 1/17 1/18 1/22 1/24   Bike  10'  10'  10'  10'  10'    Wall Slides 10' MHP 10' MHP 10'     Wall Slides Weighted  #10KB  MHP 10'  #10KB  MHP 10'  #10KB   MHP 10'  #10KB  MHP 10'    Heel Prop        EOT Flexion Stretch         Double Leg Press #75   3 x 10  #75  3 x 10 #80   3 x 10 #80  3 x 10 #80  3 x 10   Couch Stretch 10 x 10\"  10 x 10\"  10 x 10\"  30 x 10\"  30 x 10\"    Supine Pball HS Curls     30x  30x  "   Ther Activity                        Gait Training                        Modalities

## 2024-01-26 NOTE — PROGRESS NOTES
"Daily Note   Today's date: 24  Patient name: Ifeanyi Shin Jr.  : 2005  MRN: [id number]  Referring provider: Tony Snow MD  Dx:   Encounter Diagnosis     ICD-10-CM    1. S/P arthroscopic reconstruction of ACL of left knee using quadriceps tendon autograft  Z98.890     Z87.39         1 on 1 entire duration  Start Time: 1215  Stop Time: 1300  Total time in clinic (min): 45 minutes    Subjective: Patient said that his knee is feeling much better.  However his ROM remains limited.  PT reached out to [employer] Rep.  PT is recommending a Dynasplint to this patient with the blessing of Dr. Alcantar.  The Dynasplint will help improve patient's ROM.  See below for ROM measurements.       Objective: See treatment diary below  Measurements:  L Knee   PROM    Flexion 97*   Extension 0*     Assessment: Tolerated treatment well.  PT added BFR today.  Patient demonstrated fatigue post treatment, exhibited good technique with therapeutic exercises, and would benefit from continued PT    Plan: Continue per plan of care.     Precautions: ACL Reconstruction performed on 10/6     Daily Treatment Log  Manuals 1/15 1/17 1/18 1/22 1/24 1/25   Tib Glides [facility]    GISTM  [facility]   MFDc  [facility]   PROM [facility]   TKE Stretching      [facility]   Neuro Re-Ed  1/15 1/17 1/18 1/22 1/24 1/25   Wall Sits         Pball Crushers 30x5\"    30x5\"      Alt UE/Alt LE 20x5\" ea  #2AW/#2        Clamshells #5AW  30x5\" ea        Heel Raised Wall Squats 10 x 45\"  10 x 45\" 10 x 45\"  10 x 45\"  10 x 45\"     TKE [place]  #30 3x10x5\" ea #30 3x10x5\" ea      [place] Squats   3 x 10 x 5\" ea 3 x 10 x 5\" ea 3 x 10 x 5\" ea    BFR       Bike   DL Leg Press #75  Sup HS Curls   Prone Kicks W/ Stretch    Ther Ex 1/15 1/17 1/18 1/22 1/24 1/25   Bike  10'  10'  10'  10'  10'     Wall Slides 10' MHP 10' MHP 10'      Wall Slides Weighted  #10KB  MHP 10'  #10KB  MHP 10'  #10KB   MHP 10'  #10KB  MHP 10'  " "#15KB  [facility] 10'    Heel Prop         EOT Flexion Stretch          Double Leg Press #75   3 x 10  #75  3 x 10 #80   3 x 10 #80  3 x 10 #80  3 x 10    Couch Stretch 10 x 10\"  10 x 10\"  10 x 10\"  30 x 10\"  30 x 10\"  30 x 10\"    Supine Pball HS Curls     30x  30x     Ther Activity                           Gait Training                           Modalities                             "

## 2024-01-29 ENCOUNTER — OFFICE VISIT (OUTPATIENT)
Dept: PHYSICAL THERAPY | Facility: OTHER | Age: 19
End: 2024-01-29
Payer: COMMERCIAL

## 2024-01-29 DIAGNOSIS — Z98.890 S/P ARTHROSCOPIC RECONSTRUCTION OF ACL OF RIGHT KNEE USING QUADRICEPS TENDON AUTOGRAFT: Primary | ICD-10-CM

## 2024-01-29 DIAGNOSIS — Z87.39 S/P ARTHROSCOPIC RECONSTRUCTION OF ACL OF RIGHT KNEE USING QUADRICEPS TENDON AUTOGRAFT: Primary | ICD-10-CM

## 2024-01-29 PROCEDURE — 97112 NEUROMUSCULAR REEDUCATION: CPT | Performed by: PHYSICAL THERAPIST

## 2024-01-29 NOTE — PROGRESS NOTES
"Daily Note   Today's date: 24  Patient name: Ifeanyi Shin Jr.  : 2005  MRN: [id number]  Referring provider: Tony Snow MD  Dx:   Encounter Diagnosis     ICD-10-CM    1. S/P arthroscopic reconstruction of ACL of left knee using quadriceps tendon autograft  Z98.890     Z87.39         1 on 1 entire duration  Start Time: 1200  Stop Time: 1245  Total time in clinic (min): 45 minutes    Subjective: Patient said that he feels \"pretty good\" prior to the start of today's session.  He reported some  discomfort in his foot during MHP Wall Slides with KB.  PT adjusted the KB and the discomfort in his foot went away.      Objective: See treatment diary below    Assessment: Tolerated treatment well.  PT administered BFR again today.  Patient demonstrated fatigue post treatment, exhibited good technique with therapeutic exercises, and would benefit from continued PT    Plan: Continue per plan of care.     Precautions: ACL Reconstruction performed on 10/6     Daily Treatment Log  Manuals    Tib Glides [facility]   GISTM  [facility]   MFDc  [facility]   PROM [facility]    TKE Stretching    [facility]    Neuro Re-Ed     Wall Sits        Pball Crushers  30x5\"       Alt UE/Alt LE        Clamshells        Heel Raised Wall Squats 10 x 45\"  10 x 45\"  10 x 45\"   10 x 45\"    TKE [place] #30 3x10x5\" ea       [place] Squats 3 x 10 x 5\" ea 3 x 10 x 5\" ea 3 x 10 x 5\" ea     BFR     Bike   DL Leg Press #75  Sup HS Curls   Prone Kicks W/ Stretch  Wall Sits  Squat HR #25KB  Sup HS Curls  HR off edge  Add Sliders TB  SLR Flex #2   Ther Ex    Bike  10'  10'  10'   10'   Wall Slides 10'       Wall Slides Weighted #10KB  MHP 10'  #10KB   MHP 10'  #10KB  MHP 10'  #15KB  MHP 10'  #15KB  MHP 10'    Heel Prop        EOT Flexion Stretch         Double Leg Press #80   3 x 10 #80  3 x 10 #80  3 x 10     Couch Stretch 10 x 10\"  30 x 10\"  30 x " "10\"  30 x 10\"     Supine Pball HS Curls   30x  30x      Ther Activity                        Gait Training                        Modalities                          "

## 2024-01-30 DIAGNOSIS — Z98.890 STATUS POST ANTERIOR CRUCIATE LIGAMENT SURGERY: Primary | ICD-10-CM

## 2024-01-31 ENCOUNTER — OFFICE VISIT (OUTPATIENT)
Dept: PHYSICAL THERAPY | Facility: OTHER | Age: 19
End: 2024-01-31
Payer: COMMERCIAL

## 2024-01-31 DIAGNOSIS — Z87.39 S/P ARTHROSCOPIC RECONSTRUCTION OF ACL OF RIGHT KNEE USING QUADRICEPS TENDON AUTOGRAFT: Primary | ICD-10-CM

## 2024-01-31 DIAGNOSIS — Z98.890 S/P ARTHROSCOPIC RECONSTRUCTION OF ACL OF RIGHT KNEE USING QUADRICEPS TENDON AUTOGRAFT: Primary | ICD-10-CM

## 2024-01-31 PROCEDURE — 97110 THERAPEUTIC EXERCISES: CPT | Performed by: PHYSICAL THERAPIST

## 2024-01-31 PROCEDURE — 97112 NEUROMUSCULAR REEDUCATION: CPT | Performed by: PHYSICAL THERAPIST

## 2024-01-31 PROCEDURE — 97140 MANUAL THERAPY 1/> REGIONS: CPT | Performed by: PHYSICAL THERAPIST

## 2024-02-01 ENCOUNTER — TELEPHONE (OUTPATIENT)
Age: 19
End: 2024-02-01

## 2024-02-01 ENCOUNTER — OFFICE VISIT (OUTPATIENT)
Dept: PHYSICAL THERAPY | Facility: OTHER | Age: 19
End: 2024-02-01
Payer: COMMERCIAL

## 2024-02-01 DIAGNOSIS — Z87.39 S/P ARTHROSCOPIC RECONSTRUCTION OF ACL OF RIGHT KNEE USING QUADRICEPS TENDON AUTOGRAFT: Primary | ICD-10-CM

## 2024-02-01 DIAGNOSIS — Z98.890 S/P ARTHROSCOPIC RECONSTRUCTION OF ACL OF RIGHT KNEE USING QUADRICEPS TENDON AUTOGRAFT: Primary | ICD-10-CM

## 2024-02-01 PROCEDURE — 97140 MANUAL THERAPY 1/> REGIONS: CPT | Performed by: PHYSICAL THERAPIST

## 2024-02-01 PROCEDURE — 97112 NEUROMUSCULAR REEDUCATION: CPT | Performed by: PHYSICAL THERAPIST

## 2024-02-01 PROCEDURE — 97110 THERAPEUTIC EXERCISES: CPT | Performed by: PHYSICAL THERAPIST

## 2024-02-01 NOTE — TELEPHONE ENCOUNTER
Caller: familia hagen from [facility]    Doctor: Katharine     Reason for call: Faxing over some paper work for  to sign and Fax back     Call back#: [phone]

## 2024-02-04 NOTE — PROGRESS NOTES
"Daily Note   Today's date: 24  Patient name: Ifeanyi Shin Jr.  : 2005  MRN: [id number]  Referring provider: Tony Snow MD  Dx:   Encounter Diagnosis     ICD-10-CM    1. S/P arthroscopic reconstruction of ACL of left knee using quadriceps tendon autograft  Z98.890     Z87.39         1 on 1 entire duration             Subjective: Patient said that he feels \"pretty good\" prior to the start of today's session.  He reported some  discomfort in his foot during MHP Wall Slides with KB.  PT adjusted the KB and the discomfort in his foot went away.      Objective: See treatment diary below    Assessment: Tolerated treatment well.  PT administered BFR again today.  Patient demonstrated fatigue post treatment, exhibited good technique with therapeutic exercises, and would benefit from continued PT    Plan: Continue per plan of care.     Precautions: ACL Reconstruction performed on 10/6     Daily Treatment Log  Manuals    Tib Glides [facility]   GISTM  [facility]   MFDc  [facility]   PROM [facility]    TKE Stretching    [facility]    Neuro Re-Ed     Wall Sits        Pball Crushers  30x5\"       Alt UE/Alt LE        Clamshells        Heel Raised Wall Squats 10 x 45\"  10 x 45\"  10 x 45\"   10 x 45\"    TKE [place] #30 3x10x5\" ea       [place] Squats 3 x 10 x 5\" ea 3 x 10 x 5\" ea 3 x 10 x 5\" ea     BFR     Bike   DL Leg Press #75  Sup HS Curls   Prone Kicks W/ Stretch  Wall Sits  Squat HR #25KB  Sup HS Curls  HR off edge  Add Sliders TB  SLR Flex #2   Ther Ex    Bike  10'  10'  10'   10'   Wall Slides 10'       Wall Slides Weighted #10KB  MHP 10'  #10KB   MHP 10'  #10KB  MHP 10'  #15KB  MHP 10'  #15KB  MHP 10'    Heel Prop        EOT Flexion Stretch         Double Leg Press #80   3 x 10 #80  3 x 10 #80  3 x 10     Couch Stretch 10 x 10\"  30 x 10\"  30 x 10\"  30 x 10\"     Supine Pball HS Curls   30x  30x      Ther " Activity                        Gait Training                        Modalities

## 2024-02-04 NOTE — PROGRESS NOTES
"Daily Note   Today's date: 24  Patient name: Ifeanyi Shin Jr.  : 2005  MRN: [id number]  Referring provider: Tony Snow MD  Dx:   Encounter Diagnosis     ICD-10-CM    1. S/P arthroscopic reconstruction of ACL of left knee using quadriceps tendon autograft  Z98.890     Z87.39         1 on 1 entire duration             Subjective: Patient said that he feels \"pretty good\" prior to the start of today's session.  He reported some  discomfort in his foot during MHP Wall Slides with KB.  PT adjusted the KB and the discomfort in his foot went away.      Objective: See treatment diary below    Assessment: Tolerated treatment well.  PT administered BFR again today.  Patient demonstrated fatigue post treatment, exhibited good technique with therapeutic exercises, and would benefit from continued PT    Plan: Continue per plan of care.     Precautions: ACL Reconstruction performed on 10/6     Daily Treatment Log  Manuals    Tib Glides [facility]   GISTM  [facility]   MFDc  [facility]   PROM [facility]    TKE Stretching    [facility]    Neuro Re-Ed     Wall Sits        Pball Crushers  30x5\"       Alt UE/Alt LE        Clamshells        Heel Raised Wall Squats 10 x 45\"  10 x 45\"  10 x 45\"   10 x 45\"    TKE [place] #30 3x10x5\" ea       [place] Squats 3 x 10 x 5\" ea 3 x 10 x 5\" ea 3 x 10 x 5\" ea     BFR     Bike   DL Leg Press #75  Sup HS Curls   Prone Kicks W/ Stretch  Wall Sits  Squat HR #25KB  Sup HS Curls  HR off edge  Add Sliders TB  SLR Flex #2   Ther Ex    Bike  10'  10'  10'   10'   Wall Slides 10'       Wall Slides Weighted #10KB  MHP 10'  #10KB   MHP 10'  #10KB  MHP 10'  #15KB  MHP 10'  #15KB  MHP 10'    Heel Prop        EOT Flexion Stretch         Double Leg Press #80   3 x 10 #80  3 x 10 #80  3 x 10     Couch Stretch 10 x 10\"  30 x 10\"  30 x 10\"  30 x 10\"     Supine Pball HS Curls   30x  30x      Ther " Activity                        Gait Training                        Modalities

## 2024-02-05 ENCOUNTER — OFFICE VISIT (OUTPATIENT)
Dept: PHYSICAL THERAPY | Facility: OTHER | Age: 19
End: 2024-02-05
Payer: COMMERCIAL

## 2024-02-05 ENCOUNTER — TELEPHONE (OUTPATIENT)
Age: 19
End: 2024-02-05

## 2024-02-05 DIAGNOSIS — Z87.39 S/P ARTHROSCOPIC RECONSTRUCTION OF ACL OF RIGHT KNEE USING QUADRICEPS TENDON AUTOGRAFT: Primary | ICD-10-CM

## 2024-02-05 DIAGNOSIS — Z98.890 S/P ARTHROSCOPIC RECONSTRUCTION OF ACL OF RIGHT KNEE USING QUADRICEPS TENDON AUTOGRAFT: Primary | ICD-10-CM

## 2024-02-05 PROCEDURE — 97140 MANUAL THERAPY 1/> REGIONS: CPT | Performed by: PHYSICAL THERAPIST

## 2024-02-05 PROCEDURE — 97110 THERAPEUTIC EXERCISES: CPT | Performed by: PHYSICAL THERAPIST

## 2024-02-05 PROCEDURE — 97112 NEUROMUSCULAR REEDUCATION: CPT | Performed by: PHYSICAL THERAPIST

## 2024-02-05 NOTE — TELEPHONE ENCOUNTER
Caller: Sandeep from [facility]    Doctor: Katharine    Reason for call:     Sandeep is calling about the standard written order form he faxed over, he needs some paper work for  to sign and Fax back      Call back#: [phone]

## 2024-02-05 NOTE — TELEPHONE ENCOUNTER
Caller: Shell    Doctor: Katharine    Reason for call: Sandeep is calling to check on status of fax that was sent over on 2/1. Sandeep is asking for it to be signed and faxed back ASA so patient can be assisted.     Fax # [phone]    Call back # [phone]

## 2024-02-07 ENCOUNTER — OFFICE VISIT (OUTPATIENT)
Dept: PHYSICAL THERAPY | Facility: OTHER | Age: 19
End: 2024-02-07
Payer: COMMERCIAL

## 2024-02-07 DIAGNOSIS — Z98.890 S/P ARTHROSCOPIC RECONSTRUCTION OF ACL OF RIGHT KNEE USING QUADRICEPS TENDON AUTOGRAFT: Primary | ICD-10-CM

## 2024-02-07 DIAGNOSIS — Z87.39 S/P ARTHROSCOPIC RECONSTRUCTION OF ACL OF RIGHT KNEE USING QUADRICEPS TENDON AUTOGRAFT: Primary | ICD-10-CM

## 2024-02-07 PROCEDURE — 97112 NEUROMUSCULAR REEDUCATION: CPT | Performed by: PHYSICAL THERAPIST

## 2024-02-08 ENCOUNTER — OFFICE VISIT (OUTPATIENT)
Dept: PHYSICAL THERAPY | Facility: OTHER | Age: 19
End: 2024-02-08
Payer: COMMERCIAL

## 2024-02-08 DIAGNOSIS — Z98.890 S/P ARTHROSCOPIC RECONSTRUCTION OF ACL OF RIGHT KNEE USING QUADRICEPS TENDON AUTOGRAFT: Primary | ICD-10-CM

## 2024-02-08 DIAGNOSIS — Z87.39 S/P ARTHROSCOPIC RECONSTRUCTION OF ACL OF RIGHT KNEE USING QUADRICEPS TENDON AUTOGRAFT: Primary | ICD-10-CM

## 2024-02-08 PROCEDURE — 97112 NEUROMUSCULAR REEDUCATION: CPT

## 2024-02-08 NOTE — PROGRESS NOTES
"Daily Note   Today's date: 24  Patient name: Ifeanyi Shin Jr.  : 2005  MRN: [id number]  Referring provider: Tony Snow MD  Dx:   Encounter Diagnosis     ICD-10-CM    1. S/P arthroscopic reconstruction of ACL of left knee using quadriceps tendon autograft  Z98.890     Z87.39         1 on 1 [id number]  IEP [id number]  Start Time: 1345  Stop Time: 1445  Total time in clinic (min): 60 minutes    Subjective: Patient said that he felt some pain towards the medial aspect of his knee.  This went away after cupping therapy.      Objective: See treatment diary below    Assessment: Tolerated treatment well.  PT administered BFR again today.  Patient demonstrated fatigue post treatment, exhibited good technique with therapeutic exercises, and would benefit from continued PT    Plan: Continue per plan of care.     Precautions: ACL Reconstruction performed on 10/6     Daily Treatment Log  Manuals  2   Tib Glides [facility]     GISTM  [facility]   MFDc  [facility]   PROM [facility]   TKE Stretching  [facility]   Neuro Re-Ed   2    Wall Sits          Pball Crushers          Alt UE/Alt LE          Clamshells          Heel Raised Wall Squats 10 x 45\"   10 x 45\"   10 x 45\"      TKE [place]          [place] Squats 3 x 10 x 5\" ea         BFR   Bike   DL Leg Press #75  Sup HS Curls   Prone Kicks W/ Stretch  Wall Sits  Squat HR #25KB  Sup HS Curls  HR off edge  Add Sliders TB  SLR Flex #2 Bike   DL Leg Press #75  Sup HS Curls   Prone Kicks W/ Stretch  Wall Sits  Squat HR #25KB  Sup HS Curls  HR off edge  Add Sliders TB  SLR Flex #2 Bike   DL Leg Press #75  Sup HS Curls   Prone Kicks W/ Stretch  Wall Sits  DL Leg Press #75  Sup HS Curls  LAQ #10AW  Add Sliders  SLR Flex #3AW   Ther Ex 1/24    Bike  10'   10'  10'     Wall Slides          Wall Slides Weighted #10KB  MHP 10'  #15KB  MHP " "10'  #15KB  MHP 10'  #15KB  MHP 10'  #15KB  MHP 10'  #15KB  MHP 10'  #15KB  MHP 10'    Heel Prop          EOT Flexion Stretch           Double Leg Press #80  3 x 10         Couch Stretch 30 x 10\"  30 x 10\"   30 x 10\"   30 x 10\"  30 x 10\"    Supine Pball HS Curls  30x    30x  30x 30x   Ther Activity                              Gait Training                              Modalities                                "

## 2024-02-08 NOTE — PROGRESS NOTES
"Daily Note   Today's date: 24  Patient name: Ifeanyi Shin Jr.  : 2005  MRN: [id number]  Referring provider: Tony Snow MD  Dx:   Encounter Diagnosis     ICD-10-CM    1. S/P arthroscopic reconstruction of ACL of left knee using quadriceps tendon autograft  Z98.890     Z87.39         1-on-1 400p - 415p  Start Time: 1600  Stop Time: 1615  Total time in clinic (min): 15 minutes    Subjective: Patient notes he is feeling well today.    Objective: See treatment diary below    Assessment: Tolerated treatment well.  [employer] representative present for first 3/4 of today's session. Pt fitted for Dyn[employer] and educated on use by rep. Noted increased LLE musculature recruitment during L-forward B-stance squat compared to normal squat. Patient demonstrated fatigue post treatment, exhibited good technique with therapeutic exercises, and would benefit from continued PT    Plan: Continue per plan of care.     Precautions: ACL Reconstruction performed on 10/6     Daily Treatment Log  Manuals    Tib Glides [facility]      GISTM  [facility]    MFDc  [facility] GR    PROM  GR GR    TKE Stretching  [facility]    Neuro Re-Ed       Wall Sits       Pball Crushers       Alt UE/Alt LE       Clamshells       Heel Raised Wall Squats 10 x 45\"       TKE [place]       [place] Squats       BFR  Wall Sits  Squat HR #25KB  Sup HS Curls  HR off edge  Add Sliders TB  SLR Flex #2 Bike   DL Leg Press #75  Sup HS Curls   Prone Kicks W/ Stretch  Wall Sits  DL Leg Press #75  Sup HS Curls  LAQ #10AW  Add Sliders  SLR Flex #3AW    Ther Ex     Bike  10'      Wall Slides       Wall Slides Weighted #15KB  MHP 10'  #15KB  MHP 10'  #15KB  MHP 10'     Heel Prop       EOT Flexion Stretch        Standing clamshell / Lateral band walk    Vheavy mini 20x5\"  3x15'   Double Leg Press    75# 3x20   B-stance goblet squat    35# 2x20   Retro deadmill    5 min   Couch Stretch  30 x 10\"  30 x 10\"     Supine Pball HS Curls   " 30x 30x    Ther Activity                     Gait Training                     Modalities

## 2024-02-11 NOTE — PROGRESS NOTES
"Daily Note   Today's date: 02/10/24  Patient name: Ifeanyi Shin Jr.  : 2005  MRN: [id number]  Referring provider: Tony Snow MD  Dx:   Encounter Diagnosis     ICD-10-CM    1. S/P arthroscopic reconstruction of ACL of left knee using quadriceps tendon autograft  Z98.890     Z87.39         1 on 1 entire duration             Subjective: Patient said that he feels \"pretty good\" prior to the start of today's session.  He reported some  discomfort in his foot during MHP Wall Slides with KB.  PT adjusted the KB and the discomfort in his foot went away.      Objective: See treatment diary below    Assessment: Tolerated treatment well.  PT administered BFR again today.  Patient demonstrated fatigue post treatment, exhibited good technique with therapeutic exercises, and would benefit from continued PT    Plan: Continue per plan of care.     Precautions: ACL Reconstruction performed on 10/6     Daily Treatment Log  Manuals    Tib Glides [facility]   GISTM  [facility]   MFDc  [facility]   PROM [facility]    TKE Stretching    [facility]    Neuro Re-Ed     Wall Sits        Pball Crushers  30x5\"       Alt UE/Alt LE        Clamshells        Heel Raised Wall Squats 10 x 45\"  10 x 45\"  10 x 45\"   10 x 45\"    TKE [place] #30 3x10x5\" ea       [place] Squats 3 x 10 x 5\" ea 3 x 10 x 5\" ea 3 x 10 x 5\" ea     BFR     Bike   DL Leg Press #75  Sup HS Curls   Prone Kicks W/ Stretch  Wall Sits  Squat HR #25KB  Sup HS Curls  HR off edge  Add Sliders TB  SLR Flex #2   Ther Ex    Bike  10'  10'  10'   10'   Wall Slides 10'       Wall Slides Weighted #10KB  MHP 10'  #10KB   MHP 10'  #10KB  MHP 10'  #15KB  MHP 10'  #15KB  MHP 10'    Heel Prop        EOT Flexion Stretch         Double Leg Press #80   3 x 10 #80  3 x 10 #80  3 x 10     Couch Stretch 10 x 10\"  30 x 10\"  30 x 10\"  30 x 10\"     Supine Pball HS Curls   30x  30x      Ther " Activity                        Gait Training                        Modalities

## 2024-02-19 ENCOUNTER — OFFICE VISIT (OUTPATIENT)
Dept: PHYSICAL THERAPY | Facility: OTHER | Age: 19
End: 2024-02-19
Payer: COMMERCIAL

## 2024-02-19 DIAGNOSIS — Z98.890 S/P ARTHROSCOPIC RECONSTRUCTION OF ACL OF RIGHT KNEE USING QUADRICEPS TENDON AUTOGRAFT: Primary | ICD-10-CM

## 2024-02-19 DIAGNOSIS — Z87.39 S/P ARTHROSCOPIC RECONSTRUCTION OF ACL OF RIGHT KNEE USING QUADRICEPS TENDON AUTOGRAFT: Primary | ICD-10-CM

## 2024-02-19 PROCEDURE — 97110 THERAPEUTIC EXERCISES: CPT | Performed by: PHYSICAL THERAPIST

## 2024-02-19 PROCEDURE — 97112 NEUROMUSCULAR REEDUCATION: CPT | Performed by: PHYSICAL THERAPIST

## 2024-02-19 PROCEDURE — 97140 MANUAL THERAPY 1/> REGIONS: CPT | Performed by: PHYSICAL THERAPIST

## 2024-02-21 ENCOUNTER — OFFICE VISIT (OUTPATIENT)
Dept: PHYSICAL THERAPY | Facility: OTHER | Age: 19
End: 2024-02-21
Payer: COMMERCIAL

## 2024-02-21 DIAGNOSIS — Z87.39 S/P ARTHROSCOPIC RECONSTRUCTION OF ACL OF RIGHT KNEE USING QUADRICEPS TENDON AUTOGRAFT: Primary | ICD-10-CM

## 2024-02-21 DIAGNOSIS — Z98.890 S/P ARTHROSCOPIC RECONSTRUCTION OF ACL OF RIGHT KNEE USING QUADRICEPS TENDON AUTOGRAFT: Primary | ICD-10-CM

## 2024-02-21 PROCEDURE — 97112 NEUROMUSCULAR REEDUCATION: CPT | Performed by: PHYSICAL THERAPIST

## 2024-02-21 PROCEDURE — 97110 THERAPEUTIC EXERCISES: CPT | Performed by: PHYSICAL THERAPIST

## 2024-02-21 PROCEDURE — 97140 MANUAL THERAPY 1/> REGIONS: CPT | Performed by: PHYSICAL THERAPIST

## 2024-02-22 ENCOUNTER — OFFICE VISIT (OUTPATIENT)
Dept: PHYSICAL THERAPY | Facility: OTHER | Age: 19
End: 2024-02-22
Payer: COMMERCIAL

## 2024-02-22 DIAGNOSIS — Z87.39 S/P ARTHROSCOPIC RECONSTRUCTION OF ACL OF RIGHT KNEE USING QUADRICEPS TENDON AUTOGRAFT: Primary | ICD-10-CM

## 2024-02-22 DIAGNOSIS — Z98.890 S/P ARTHROSCOPIC RECONSTRUCTION OF ACL OF RIGHT KNEE USING QUADRICEPS TENDON AUTOGRAFT: Primary | ICD-10-CM

## 2024-02-22 PROCEDURE — 97112 NEUROMUSCULAR REEDUCATION: CPT | Performed by: PHYSICAL THERAPIST

## 2024-02-22 PROCEDURE — 97140 MANUAL THERAPY 1/> REGIONS: CPT | Performed by: PHYSICAL THERAPIST

## 2024-02-22 PROCEDURE — 97110 THERAPEUTIC EXERCISES: CPT | Performed by: PHYSICAL THERAPIST

## 2024-02-23 ENCOUNTER — OFFICE VISIT (OUTPATIENT)
Dept: OBGYN CLINIC | Facility: CLINIC | Age: 19
End: 2024-02-23
Payer: COMMERCIAL

## 2024-02-23 VITALS
HEART RATE: 91 BPM | SYSTOLIC BLOOD PRESSURE: 106 MMHG | DIASTOLIC BLOOD PRESSURE: 73 MMHG | HEIGHT: 70 IN | WEIGHT: 207 LBS | BODY MASS INDEX: 29.63 KG/M2

## 2024-02-23 DIAGNOSIS — M25.662 KNEE STIFFNESS, LEFT: ICD-10-CM

## 2024-02-23 DIAGNOSIS — Z98.890 STATUS POST ANTERIOR CRUCIATE LIGAMENT SURGERY: Primary | ICD-10-CM

## 2024-02-23 PROCEDURE — 99214 OFFICE O/P EST MOD 30 MIN: CPT | Performed by: ORTHOPAEDIC SURGERY

## 2024-02-23 NOTE — PROGRESS NOTES
Subjective   CHIEF COMPLAINT/REASON FOR VISIT  Ifeanyi Shin Jr. is a 18 y.o. male who presents for 4 month post op follow-up Left knee ACL with medial and lateral meniscectomy performed at [facility] by Dr. Snow 10/06/23    HISTORY OF PRESENT ILLNESS  Overall, he feels things are going well and progressing appropriately. Pain has been well controlled. He has been following the post-operative rehabilitation regimen without difficultly. He reports persistent stiffness in the knee.     Objective   PHYSICAL EXAMINATION  Left  Knee  MUSCULOSKELETAL EXAMINATION:  Incision: Clean, dry, intact and healed  Surgery Site: normal, no evidence of infection   Range of Motion: Limited due to stiffness and 0-90  Negative lachman  Neurovascular status: Neuro intact, good cap refill  Activity Restrictions:  per protocol      Assessment/Plan   1. Status Post Left knee ACL recontruction 10/06/23 by Dr. Snow    He is doing well after surgery and making slow progress.   Patient does have limited flexion on exam today  Discussed the possibility of manipulation under anesthesia.  I would like to obtain a MRI for further evaluation prior to proceeding with manipulation    We will plan to see him back after the MRI is obtained. If any issues, questions, or concerns arise between now and the next appointment, we have encouraged the patient contact our team.        Scribe Attestation      I,:  Jeannie Ascencio am acting as a scribe while in the presence of the attending physician.:       I,:  Deo Alcantar MD personally performed the services described in this documentation    as scribed in my presence.:

## 2024-02-25 NOTE — PROGRESS NOTES
"Daily Note   Today's date: 24  Patient name: Ifeanyi Shin Jr.  : 2005  MRN: [id number]  Referring provider: Tony Snow MD  Dx:   Encounter Diagnosis     ICD-10-CM    1. S/P arthroscopic reconstruction of ACL of left knee using quadriceps tendon autograft  Z98.890     Z87.39         1-on-1 400p - 415p             Subjective: Patient notes he is feeling well today.    Objective: See treatment diary below    Assessment: Tolerated treatment well.  Dynasplint representative present for first 3/4 of today's session. Pt fitted for Dynasplint and educated on use by rep. Noted increased LLE musculature recruitment during L-forward B-stance squat compared to normal squat. Patient demonstrated fatigue post treatment, exhibited good technique with therapeutic exercises, and would benefit from continued PT    Plan: Continue per plan of care.     Precautions: ACL Reconstruction performed on 10/6     Daily Treatment Log  Manuals    Tib Glides GRC      GISTM  GRC GRC GRC    MFDc  GRC GRC GRC    PROM  GR GR    TKE Stretching  [facility]    Neuro Re-Ed       Wall Sits       Pball Crushers       Alt UE/Alt LE       Clamshells       Heel Raised Wall Squats 10 x 45\"       TKE [place]       [place] Squats       BFR  Wall Sits  Squat HR #25KB  Sup HS Curls  HR off edge  Add Sliders TB  SLR Flex #2 Bike   DL Leg Press #75  Sup HS Curls   Prone Kicks W/ Stretch  Wall Sits  DL Leg Press #75  Sup HS Curls  LAQ #10AW  Add Sliders  SLR Flex #3AW    Ther Ex     Bike  10'      Wall Slides       Wall Slides Weighted #15KB  MHP 10'  #15KB  MHP 10'  #15KB  MHP 10'     Heel Prop       EOT Flexion Stretch        Standing clamshell / Lateral band walk    Vheavy mini 20x5\"  3x15'   Double Leg Press    75# 3x20   B-stance goblet squat    35# 2x20   Retro deadmill    5 min   Couch Stretch  30 x 10\"  30 x 10\"     Supine Pball HS Curls   30x 30x    Ther Activity                     Gait Training        "              Modalities

## 2024-02-25 NOTE — PROGRESS NOTES
"Daily Note   Today's date: 24  Patient name: Ifeanyi Shin Jr.  : 2005  MRN: [id number]  Referring provider: Tony Snow MD  Dx:   Encounter Diagnosis     ICD-10-CM    1. S/P arthroscopic reconstruction of ACL of left knee using quadriceps tendon autograft  Z98.890     Z87.39         1-on-1 400p - 415p             Subjective: Patient notes he is feeling well today.    Objective: See treatment diary below    Assessment: Tolerated treatment well.  Dynasplint representative present for first 3/4 of today's session. Pt fitted for Dynasplint and educated on use by rep. Noted increased LLE musculature recruitment during L-forward B-stance squat compared to normal squat. Patient demonstrated fatigue post treatment, exhibited good technique with therapeutic exercises, and would benefit from continued PT    Plan: Continue per plan of care.     Precautions: ACL Reconstruction performed on 10/6     Daily Treatment Log  Manuals    Tib Glides GRC      GISTM  GRC GRC GRC    MFDc  GRC GRC GRC    PROM  GR GR    TKE Stretching  [facility]    Neuro Re-Ed       Wall Sits       Pball Crushers       Alt UE/Alt LE       Clamshells       Heel Raised Wall Squats 10 x 45\"       TKE [place]       [place] Squats       BFR  Wall Sits  Squat HR #25KB  Sup HS Curls  HR off edge  Add Sliders TB  SLR Flex #2 Bike   DL Leg Press #75  Sup HS Curls   Prone Kicks W/ Stretch  Wall Sits  DL Leg Press #75  Sup HS Curls  LAQ #10AW  Add Sliders  SLR Flex #3AW    Ther Ex     Bike  10'      Wall Slides       Wall Slides Weighted #15KB  MHP 10'  #15KB  MHP 10'  #15KB  MHP 10'     Heel Prop       EOT Flexion Stretch        Standing clamshell / Lateral band walk    Vheavy mini 20x5\"  3x15'   Double Leg Press    75# 3x20   B-stance goblet squat    35# 2x20   Retro deadmill    5 min   Couch Stretch  30 x 10\"  30 x 10\"     Supine Pball HS Curls   30x 30x    Ther Activity                     Gait Training        "              Modalities

## 2024-02-25 NOTE — PROGRESS NOTES
"Daily Note   Today's date: 24  Patient name: Ifeanyi Shin Jr.  : 2005  MRN: [id number]  Referring provider: Tony Snow MD  Dx:   Encounter Diagnosis     ICD-10-CM    1. S/P arthroscopic reconstruction of ACL of left knee using quadriceps tendon autograft  Z98.890     Z87.39         1-on-1 400p - 415p             Subjective: Patient notes he is feeling well today.    Objective: See treatment diary below    Assessment: Tolerated treatment well.  Dynasplint representative present for first 3/4 of today's session. Pt fitted for Dynasplint and educated on use by rep. Noted increased LLE musculature recruitment during L-forward B-stance squat compared to normal squat. Patient demonstrated fatigue post treatment, exhibited good technique with therapeutic exercises, and would benefit from continued PT    Plan: Continue per plan of care.     Precautions: ACL Reconstruction performed on 10/6     Daily Treatment Log  Manuals    Tib Glides GRC      GISTM  GRC GRC GRC    MFDc  GRC GRC GRC    PROM  GR GR    TKE Stretching  [facility]    Neuro Re-Ed       Wall Sits       Pball Crushers       Alt UE/Alt LE       Clamshells       Heel Raised Wall Squats 10 x 45\"       TKE [place]       [place] Squats       BFR  Wall Sits  Squat HR #25KB  Sup HS Curls  HR off edge  Add Sliders TB  SLR Flex #2 Bike   DL Leg Press #75  Sup HS Curls   Prone Kicks W/ Stretch  Wall Sits  DL Leg Press #75  Sup HS Curls  LAQ #10AW  Add Sliders  SLR Flex #3AW    Ther Ex     Bike  10'      Wall Slides       Wall Slides Weighted #15KB  MHP 10'  #15KB  MHP 10'  #15KB  MHP 10'     Heel Prop       EOT Flexion Stretch        Standing clamshell / Lateral band walk    Vheavy mini 20x5\"  3x15'   Double Leg Press    75# 3x20   B-stance goblet squat    35# 2x20   Retro deadmill    5 min   Couch Stretch  30 x 10\"  30 x 10\"     Supine Pball HS Curls   30x 30x    Ther Activity                     Gait Training        "              Modalities

## 2024-02-26 ENCOUNTER — OFFICE VISIT (OUTPATIENT)
Dept: PHYSICAL THERAPY | Facility: OTHER | Age: 19
End: 2024-02-26
Payer: COMMERCIAL

## 2024-02-26 DIAGNOSIS — Z87.39 S/P ARTHROSCOPIC RECONSTRUCTION OF ACL OF RIGHT KNEE USING QUADRICEPS TENDON AUTOGRAFT: Primary | ICD-10-CM

## 2024-02-26 DIAGNOSIS — Z98.890 S/P ARTHROSCOPIC RECONSTRUCTION OF ACL OF RIGHT KNEE USING QUADRICEPS TENDON AUTOGRAFT: Primary | ICD-10-CM

## 2024-02-26 PROCEDURE — 97140 MANUAL THERAPY 1/> REGIONS: CPT

## 2024-02-26 PROCEDURE — 97110 THERAPEUTIC EXERCISES: CPT

## 2024-02-26 NOTE — PROGRESS NOTES
"Daily Note   Today's date: 24  Patient name: Ifeanyi Shin Jr.  : 2005  MRN: [id number]  Referring provider: Tony Snow MD  Dx:   Encounter Diagnosis     ICD-10-CM    1. S/P arthroscopic reconstruction of ACL of left knee using quadriceps tendon autograft  Z98.890     Z87.39             Start Time: 1445  Stop Time: 1530  Total time in clinic (min): 45 minutes    Subjective: Patient reports he met with the referring doctor this past week with considerations of YANIRA for L knee. Pt has MRI scheduled for this Friday and follow-up with surgeon on Monday.    Objective: See treatment diary below  Knee Flexion: 105    Assessment: Tolerated treatment well.  Demonstrating good L quad eccentric control with split squat to available depth.  Patient demonstrated fatigue post treatment, exhibited good technique with therapeutic exercises, and would benefit from continued PT    Plan: Continue per plan of care.     Precautions: ACL Reconstruction performed on 10/6     Daily Treatment Log  Manuals    Tib Glides    GRC SFP   GISTM   GRC  GRC    MFDc     GRC    PROM  GRC  GRC SFP   TKE Stretching    GR SFP   Neuro Re-Ed      Wall Sits        Pball Crushers        Alt UE/Alt LE        Clamshells        Heel Raised Wall Squats     3x45\"   TKE [place]        [place] Squats   10 x 40\"      BFR   Wall Sits  DL Leg Press #75  Sup HS Curls   SL RDLs  Wall Sits  DL Leg Press #75  Sup HS Curls   SL RDLs      Ther Ex     Bike      10 min   Wall Slides        Wall Slides Weighted  Opp Leg   MHP 10'  Opp Leg   MHP 10'  Opp Leg   MHP 10'  15#  MHP 10'    Heel Prop        EOT Flexion Stretch         Standing clamshell / Lateral band walk Vheavy mini 20x5\"  3x15'       Double Leg Press 75# 3x20       B-stance goblet squat 35# 2x20       Split squat     Heel-elevated 3x12 4\" ecc   Retro deadmill 5 min  5 min  5 min 5 min   Couch Stretch   30 x 10\"  30 x 10\"  30 x 10\" "   Supine Pball HS [place]         Ther Activity                        Gait Training                        Modalities

## 2024-02-28 ENCOUNTER — OFFICE VISIT (OUTPATIENT)
Dept: PHYSICAL THERAPY | Facility: OTHER | Age: 19
End: 2024-02-28
Payer: COMMERCIAL

## 2024-02-28 DIAGNOSIS — Z87.39 S/P ARTHROSCOPIC RECONSTRUCTION OF ACL OF RIGHT KNEE USING QUADRICEPS TENDON AUTOGRAFT: Primary | ICD-10-CM

## 2024-02-28 DIAGNOSIS — Z98.890 S/P ARTHROSCOPIC RECONSTRUCTION OF ACL OF RIGHT KNEE USING QUADRICEPS TENDON AUTOGRAFT: Primary | ICD-10-CM

## 2024-02-28 PROCEDURE — 97112 NEUROMUSCULAR REEDUCATION: CPT | Performed by: PEDIATRICS

## 2024-02-28 PROCEDURE — 97140 MANUAL THERAPY 1/> REGIONS: CPT | Performed by: PEDIATRICS

## 2024-02-28 PROCEDURE — 97110 THERAPEUTIC EXERCISES: CPT | Performed by: PEDIATRICS

## 2024-02-28 NOTE — PROGRESS NOTES
"Daily Note   Today's date: 24  Patient name: Ifeanyi Shin Jr.  : 2005  MRN: [id number]  Referring provider: Tony Snow MD  Dx:   Encounter Diagnosis     ICD-10-CM    1. S/P arthroscopic reconstruction of ACL of left knee using quadriceps tendon autograft  Z98.890     Z87.39                        Subjective: Patient reports he is stiff but not having pain. MRI Friday and follow up with surgeon on Monday.     Objective: See treatment diary below  Knee Flexion: 105    Assessment: Tolerated treatment well.  Resumed BFR today. Continues to demonstrate limited knee flexion.  Patient demonstrated fatigue post treatment, exhibited good technique with therapeutic exercises, and would benefit from continued PT    Plan: Continue per plan of care.     Precautions: ACL Reconstruction performed on 10/6     Daily Treatment Log  Manuals    Tib Glides    GRC SFP    GISTM   GRC  GRC  EW   MFDc     GRC     PROM  GRC  GRC SFP EW   TKE Stretching    GRC SFP EW   Neuro Re-Ed       Wall Sits         Pball Crushers         Alt UE/Alt LE         Clamshells         Heel Raised Wall Squats     3x45\"    TKE [place]         [place] Squats   10 x 40\"       BFR   Wall Sits  DL Leg Press #75  Sup HS Curls   SL RDLs  Wall Sits  DL Leg Press #75  Sup HS Curls   SL RDLs    Wall Sits  DL Leg Press #75  Pball HS Curls   SL RDLs 15#   Ther Ex      Bike      10 min 10 min   Wall Slides         Wall Slides Weighted  Opp Leg   MHP 10'  Opp Leg   MHP 10'  Opp Leg   MHP 10'  15#  MHP 10'     Heel Prop         EOT Flexion Stretch          Standing clamshell / Lateral band walk Vheavy mini 20x5\"  3x15'        Double Leg Press 75# 3x20        B-stance goblet squat 35# 2x20        Split squat     Heel-elevated 3x12 4\" ecc    Retro deadmill 5 min  5 min  5 min 5 min    Couch Stretch   30 x 10\"  30 x 10\"  30 x 10\"    Supine Pball HS Curls          Ther Activity                "            Gait Training                           Modalities

## 2024-02-29 ENCOUNTER — OFFICE VISIT (OUTPATIENT)
Dept: PHYSICAL THERAPY | Facility: OTHER | Age: 19
End: 2024-02-29
Payer: COMMERCIAL

## 2024-02-29 DIAGNOSIS — Z87.39 S/P ARTHROSCOPIC RECONSTRUCTION OF ACL OF RIGHT KNEE USING QUADRICEPS TENDON AUTOGRAFT: Primary | ICD-10-CM

## 2024-02-29 DIAGNOSIS — Z98.890 S/P ARTHROSCOPIC RECONSTRUCTION OF ACL OF RIGHT KNEE USING QUADRICEPS TENDON AUTOGRAFT: Primary | ICD-10-CM

## 2024-02-29 PROCEDURE — 97140 MANUAL THERAPY 1/> REGIONS: CPT

## 2024-02-29 PROCEDURE — 97010 HOT OR COLD PACKS THERAPY: CPT

## 2024-02-29 PROCEDURE — 97110 THERAPEUTIC EXERCISES: CPT

## 2024-02-29 NOTE — PROGRESS NOTES
"Daily Note   Today's date: 24  Patient name: Ifeanyi Shin Jr.  : 2005  MRN: [id number]  Referring provider: Tony Snow MD  Dx:   Encounter Diagnosis     ICD-10-CM    1. S/P arthroscopic reconstruction of ACL of left knee using quadriceps tendon autograft  Z98.890     Z87.39                        Subjective: Patient reports no changes since last session, stiffness persists.0/10 pain at rest, 3/10 Pain with available end range passive knee flexion.     Objective: See treatment diary below  Knee Flexion: 105    Assessment: Tolerated treatment well. Minimal restriction noted with IASTM, consider cupping NV to improve blood flow and mobility. Continued BFR. Patient demonstrated fatigue post treatment, exhibited good technique with therapeutic exercises, and would benefit from continued PT to improve range of motion and strength. Most fatigued with pball curls.    Plan: Continue per plan of care.  Progress NV.    Precautions: ACL Reconstruction performed on 10/6     Daily Treatment Log  Manuals    Tib Glides    GRC SFP     GISTM   GRC  GRC  EW MP   MFDc     GRC      PROM  GRC  GRC SFP EW MP   TKE Stretching    GRC SFP EW MP   Neuro Re-Ed        Wall Sits          Pball Crushers          Alt UE/Alt LE          Clamshells          Heel Raised Wall Squats     3x45\"     TKE [place]          [place] Squats   10 x 40\"        BFR   Wall Sits  DL Leg Press #75  Sup HS Curls   SL RDLs  Wall Sits  DL Leg Press #75  Sup HS Curls   SL RDLs    Wall Sits  DL Leg Press #75  Pball HS Curls   SL RDLs 15# Wall Sits 1' x 4  DL Leg Press #75  Pball HS Curls  SL RDL 15#   Ther Ex       Bike      10 min 10 min    Wall Slides          Wall Slides Weighted  Opp Leg   MHP 10'  Opp Leg   MHP 10'  Opp Leg   MHP 10'  15#  MHP 10'   15# MHP 10'   Heel Prop          EOT Flexion Stretch           Standing clamshell / Lateral band walk Vheavy mini 20x5\"  3x15'   " "      Double Leg Press 75# 3x20         B-stance goblet squat 35# 2x20         Split squat     Heel-elevated 3x12 4\" ecc     Retro deadmill 5 min  5 min  5 min 5 min     Couch Stretch   30 x 10\"  30 x 10\"  30 x 10\"     Supine Pball HS Curls           Ther Activity                              Gait Training                              Modalities                                "

## 2024-03-01 ENCOUNTER — HOSPITAL ENCOUNTER (OUTPATIENT)
Dept: RADIOLOGY | Facility: HOSPITAL | Age: 19
Discharge: HOME | End: 2024-03-01
Attending: ORTHOPAEDIC SURGERY
Payer: COMMERCIAL

## 2024-03-01 DIAGNOSIS — M25.662 KNEE STIFFNESS, LEFT: ICD-10-CM

## 2024-03-01 DIAGNOSIS — Z98.890 STATUS POST ANTERIOR CRUCIATE LIGAMENT SURGERY: ICD-10-CM

## 2024-03-01 PROCEDURE — 73721 MRI JNT OF LWR EXTRE W/O DYE: CPT

## 2024-03-04 ENCOUNTER — OFFICE VISIT (OUTPATIENT)
Dept: OBGYN CLINIC | Facility: CLINIC | Age: 19
End: 2024-03-04
Payer: COMMERCIAL

## 2024-03-04 ENCOUNTER — OFFICE VISIT (OUTPATIENT)
Dept: PHYSICAL THERAPY | Facility: OTHER | Age: 19
End: 2024-03-04
Payer: COMMERCIAL

## 2024-03-04 VITALS
HEIGHT: 70 IN | SYSTOLIC BLOOD PRESSURE: 127 MMHG | BODY MASS INDEX: 29.63 KG/M2 | HEART RATE: 96 BPM | DIASTOLIC BLOOD PRESSURE: 82 MMHG | WEIGHT: 207 LBS

## 2024-03-04 DIAGNOSIS — Z98.890 S/P ARTHROSCOPIC RECONSTRUCTION OF ACL OF RIGHT KNEE USING QUADRICEPS TENDON AUTOGRAFT: Primary | ICD-10-CM

## 2024-03-04 DIAGNOSIS — M24.662 FIBROSIS OF LEFT KNEE JOINT: Primary | ICD-10-CM

## 2024-03-04 DIAGNOSIS — Z87.39 S/P ARTHROSCOPIC RECONSTRUCTION OF ACL OF RIGHT KNEE USING QUADRICEPS TENDON AUTOGRAFT: Primary | ICD-10-CM

## 2024-03-04 PROCEDURE — 97112 NEUROMUSCULAR REEDUCATION: CPT | Performed by: PHYSICAL THERAPIST

## 2024-03-04 PROCEDURE — 99214 OFFICE O/P EST MOD 30 MIN: CPT | Performed by: ORTHOPAEDIC SURGERY

## 2024-03-04 PROCEDURE — 97140 MANUAL THERAPY 1/> REGIONS: CPT | Performed by: PHYSICAL THERAPIST

## 2024-03-04 PROCEDURE — 97110 THERAPEUTIC EXERCISES: CPT | Performed by: PHYSICAL THERAPIST

## 2024-03-04 RX ORDER — CHLORHEXIDINE GLUCONATE ORAL RINSE 1.2 MG/ML
15 SOLUTION DENTAL ONCE
Status: CANCELLED | OUTPATIENT
Start: 2024-03-13 | End: 2024-03-04

## 2024-03-04 RX ORDER — CEFAZOLIN SODIUM 2 G/50ML
2000 SOLUTION INTRAVENOUS ONCE
Status: CANCELLED | OUTPATIENT
Start: 2024-03-13 | End: 2024-03-04

## 2024-03-04 NOTE — PROGRESS NOTES
REASON FOR FOLLOW-UP  Ifeanyi Shin Jr. is a 18 y.o. male who presents for follow-up of the left knee stiffness status post ACL surgery on 10/6/23    HISTORY OF PRESENT ILLNESS  Following our last visit, we decided to initially treat his left knee stiffness status post ACL surgery on 10/6/23 non-operatively. Our plan was to manage his symptoms conservatively with RICE, anti-inflammatories, and physical therapy. Patient said he has experienced no change in ROM from when we last saw him. Patient said prior to surgery he had decent range of motion of the surgery.    PHYSICAL EXAMINATION    Musculoskeletal: left Knee Examination:  General: The patient is alert, oriented, and pleasant to interact with.  Patient ambulates with antalgic gait pattern  Assistive Device: No  Alignment: normal  Skin is warm and dry to touch with no signs of erythema, ecchymosis, or infection   Effusion: none  ROM: 0° - 90°  verus 0° - 135° on contralateral side  MMT: deferred  TTP: None  Flexor and extensor mechanisms are intact   Knee is stable to varus and valgus stress  2+ DP and PT pulses with brisk capillary refill to the toes  Sural, saphenous, tibial, superficial, and deep peroneal motor and sensory distributions intact  Sensation light touch intact distally      DIAGNOSTIC IMAGING:  Left knee MRI appears to show intact MRI graft with no other obvious acute osseous abnormalities      IMPRESSION/REPORT/PLAN  Status post ACL surgery on 10/6/23, with knee arthrofibrosis, limited range of motion/stiffness.     We had a lengthy discussion regarding the diagnosis, natural history, and treatment options. We discussed both non-operative and operative treatments. He has failed the non-operative treatment options that include, but are not limited to rest, ice, activity modification, anti-inflammatory medication, physical therapy, and/or injections. He has continued to experience significant symptoms and dysfunction and is ready to proceed  with surgery. I certainly understand and am in agreement. Accordingly, we will plan to proceed with  Knee arthroscopy for lysis of adhesive lesions, surgical manipulation under anesthesia, plus all other indicated procedures.    We discussed the anticipated pre-, intra-, and postoperative course in great detail. Specifically, we discussed the recovery and rehabilitation protocol and time lines. He would like to move forward with booking surgery. All of his questions were answered, and he voiced understanding of the plan.       Scribe Attestation      I,:  Bong Han PA-C am acting as a scribe while in the presence of the attending physician.:       I,:  Deo Alcantar MD personally performed the services described in this documentation    as scribed in my presence.:

## 2024-03-06 ENCOUNTER — ANESTHESIA EVENT (OUTPATIENT)
Age: 19
End: 2024-03-06
Payer: COMMERCIAL

## 2024-03-06 ENCOUNTER — APPOINTMENT (OUTPATIENT)
Dept: PHYSICAL THERAPY | Facility: OTHER | Age: 19
End: 2024-03-06
Payer: COMMERCIAL

## 2024-03-12 NOTE — PRE-PROCEDURE INSTRUCTIONS
Pre-Surgery Instructions:   Medication Instructions    ibuprofen (MOTRIN) 400 mg tablet Hold from now till after procedure       Medication instructions for day surgery reviewed. Please use only a sip of water to take your instructed medications. Avoid all over the counter vitamins, supplements and NSAIDS for one week prior to surgery per anesthesia guidelines. Tylenol is ok to take as needed.     You will receive a call one business day prior to surgery with an arrival time and hospital directions. If your surgery is scheduled on a Monday, the hospital will be calling you on the Friday prior to your surgery. If you have not heard from anyone by 8pm, please call the hospital supervisor through the hospital  at [phone]. ([place] [phone] or [place] [phone]).    Do not eat or drink anything after midnight the night before your surgery, including candy, mints, lifesavers, or chewing gum. Do not drink alcohol 24hrs before your surgery. Try not to smoke at least 24hrs before your surgery.       Follow the pre surgery showering instructions as listed in the “My Surgical Experience Booklet” or otherwise provided by your surgeon's office. Do not use a blade to shave the surgical area 1 week before surgery. It is okay to use a clean electric clippers up to 24 hours before surgery. Do not apply any lotions, creams, including makeup, cologne, deodorant, or perfumes after showering on the day of your surgery. Do not use dry shampoo, hair spray, hair gel, or any type of hair products.     No contact lenses, eye make-up, or artificial eyelashes. Remove nail polish, including gel polish, and any artificial, gel, or acrylic nails if possible. Remove all jewelry including rings and body piercing jewelry.     Wear causal clothing that is easy to take on and off. Consider your type of surgery.    Keep any valuables, jewelry, piercings at home. Please bring any specially ordered equipment (sling, braces) if  indicated.    Arrange for a responsible person to drive you to and from the hospital on the day of your surgery. Please confirm the visitor policy for the day of your procedure when you receive your phone call with an arrival time.     Call the surgeon's office with any new illnesses, exposures, or additional questions prior to surgery.    Please reference your “My Surgical Experience Booklet” for additional information to prepare for your upcoming surgery.

## 2024-03-13 ENCOUNTER — HOSPITAL ENCOUNTER (OUTPATIENT)
Age: 19
Setting detail: OUTPATIENT SURGERY
Discharge: HOME | End: 2024-03-13
Attending: ORTHOPAEDIC SURGERY | Admitting: ORTHOPAEDIC SURGERY
Payer: COMMERCIAL

## 2024-03-13 ENCOUNTER — ANESTHESIA (OUTPATIENT)
Age: 19
End: 2024-03-13
Payer: COMMERCIAL

## 2024-03-13 VITALS
HEIGHT: 70 IN | DIASTOLIC BLOOD PRESSURE: 69 MMHG | RESPIRATION RATE: 15 BRPM | TEMPERATURE: 98.6 F | HEART RATE: 84 BPM | BODY MASS INDEX: 29.58 KG/M2 | OXYGEN SATURATION: 96 % | SYSTOLIC BLOOD PRESSURE: 116 MMHG | WEIGHT: 206.6 LBS

## 2024-03-13 DIAGNOSIS — Z98.890 STATUS POST KNEE SURGERY: Primary | ICD-10-CM

## 2024-03-13 PROCEDURE — 29884 ARTHRS KNEE SURG LYSIS ADS: CPT | Performed by: ORTHOPAEDIC SURGERY

## 2024-03-13 RX ORDER — OXYCODONE HYDROCHLORIDE 5 MG/1
5 TABLET ORAL EVERY 4 HOURS PRN
Qty: 15 TABLET | Refills: 0 | Status: SHIPPED | OUTPATIENT
Start: 2024-03-13

## 2024-03-13 RX ORDER — NAPROXEN 500 MG/1
500 TABLET ORAL 2 TIMES DAILY WITH MEALS
Qty: 60 TABLET | Refills: 0 | Status: SHIPPED | OUTPATIENT
Start: 2024-03-13

## 2024-03-13 RX ORDER — CEFAZOLIN SODIUM 2 G/50ML
2000 SOLUTION INTRAVENOUS ONCE
Status: DISCONTINUED | OUTPATIENT
Start: 2024-03-13 | End: 2024-03-13 | Stop reason: HOSPADM

## 2024-03-13 RX ORDER — ONDANSETRON 2 MG/ML
4 INJECTION INTRAMUSCULAR; INTRAVENOUS ONCE AS NEEDED
Status: DISCONTINUED | OUTPATIENT
Start: 2024-03-13 | End: 2024-03-13 | Stop reason: HOSPADM

## 2024-03-13 RX ORDER — PROPOFOL 10 MG/ML
INJECTION, EMULSION INTRAVENOUS CONTINUOUS PRN
Status: DISCONTINUED | OUTPATIENT
Start: 2024-03-13 | End: 2024-03-13

## 2024-03-13 RX ORDER — FENTANYL CITRATE 50 UG/ML
INJECTION, SOLUTION INTRAMUSCULAR; INTRAVENOUS AS NEEDED
Status: DISCONTINUED | OUTPATIENT
Start: 2024-03-13 | End: 2024-03-13

## 2024-03-13 RX ORDER — BUPIVACAINE HYDROCHLORIDE 5 MG/ML
INJECTION, SOLUTION EPIDURAL; INTRACAUDAL AS NEEDED
Status: DISCONTINUED | OUTPATIENT
Start: 2024-03-13 | End: 2024-03-13 | Stop reason: HOSPADM

## 2024-03-13 RX ORDER — CEFAZOLIN SODIUM 2 G/50ML
SOLUTION INTRAVENOUS AS NEEDED
Status: DISCONTINUED | OUTPATIENT
Start: 2024-03-13 | End: 2024-03-13

## 2024-03-13 RX ORDER — LIDOCAINE HYDROCHLORIDE 10 MG/ML
INJECTION, SOLUTION EPIDURAL; INFILTRATION; INTRACAUDAL; PERINEURAL AS NEEDED
Status: DISCONTINUED | OUTPATIENT
Start: 2024-03-13 | End: 2024-03-13

## 2024-03-13 RX ORDER — SODIUM CHLORIDE, SODIUM LACTATE, POTASSIUM CHLORIDE, CALCIUM CHLORIDE 600; 310; 30; 20 MG/100ML; MG/100ML; MG/100ML; MG/100ML
125 INJECTION, SOLUTION INTRAVENOUS CONTINUOUS
Status: DISCONTINUED | OUTPATIENT
Start: 2024-03-13 | End: 2024-03-13 | Stop reason: HOSPADM

## 2024-03-13 RX ORDER — FENTANYL CITRATE/PF 50 MCG/ML
50 SYRINGE (ML) INJECTION
Status: DISCONTINUED | OUTPATIENT
Start: 2024-03-13 | End: 2024-03-13 | Stop reason: HOSPADM

## 2024-03-13 RX ORDER — EPHEDRINE SULFATE 50 MG/ML
INJECTION INTRAVENOUS AS NEEDED
Status: DISCONTINUED | OUTPATIENT
Start: 2024-03-13 | End: 2024-03-13

## 2024-03-13 RX ORDER — KETOROLAC TROMETHAMINE 30 MG/ML
INJECTION, SOLUTION INTRAMUSCULAR; INTRAVENOUS AS NEEDED
Status: DISCONTINUED | OUTPATIENT
Start: 2024-03-13 | End: 2024-03-13

## 2024-03-13 RX ORDER — BUPIVACAINE HYDROCHLORIDE 2.5 MG/ML
INJECTION, SOLUTION EPIDURAL; INFILTRATION; INTRACAUDAL
Status: DISCONTINUED | OUTPATIENT
Start: 2024-03-13 | End: 2024-03-13

## 2024-03-13 RX ORDER — HYDROMORPHONE HCL/PF 1 MG/ML
SYRINGE (ML) INJECTION AS NEEDED
Status: DISCONTINUED | OUTPATIENT
Start: 2024-03-13 | End: 2024-03-13

## 2024-03-13 RX ORDER — OXYCODONE HYDROCHLORIDE 5 MG/1
5 TABLET ORAL EVERY 4 HOURS PRN
Status: DISCONTINUED | OUTPATIENT
Start: 2024-03-13 | End: 2024-03-13 | Stop reason: HOSPADM

## 2024-03-13 RX ORDER — PROPOFOL 10 MG/ML
INJECTION, EMULSION INTRAVENOUS AS NEEDED
Status: DISCONTINUED | OUTPATIENT
Start: 2024-03-13 | End: 2024-03-13

## 2024-03-13 RX ORDER — ONDANSETRON 2 MG/ML
INJECTION INTRAMUSCULAR; INTRAVENOUS AS NEEDED
Status: DISCONTINUED | OUTPATIENT
Start: 2024-03-13 | End: 2024-03-13

## 2024-03-13 RX ORDER — HYDROMORPHONE HCL IN WATER/PF 6 MG/30 ML
0.2 PATIENT CONTROLLED ANALGESIA SYRINGE INTRAVENOUS
Status: DISCONTINUED | OUTPATIENT
Start: 2024-03-13 | End: 2024-03-13 | Stop reason: HOSPADM

## 2024-03-13 RX ORDER — MIDAZOLAM HYDROCHLORIDE 2 MG/2ML
INJECTION, SOLUTION INTRAMUSCULAR; INTRAVENOUS AS NEEDED
Status: DISCONTINUED | OUTPATIENT
Start: 2024-03-13 | End: 2024-03-13

## 2024-03-13 RX ORDER — ACETAMINOPHEN 325 MG/1
325 TABLET ORAL EVERY 6 HOURS PRN
Qty: 30 TABLET | Refills: 0 | Status: SHIPPED | OUTPATIENT
Start: 2024-03-13

## 2024-03-13 RX ORDER — DEXAMETHASONE SODIUM PHOSPHATE 10 MG/ML
INJECTION, SOLUTION INTRAMUSCULAR; INTRAVENOUS AS NEEDED
Status: DISCONTINUED | OUTPATIENT
Start: 2024-03-13 | End: 2024-03-13

## 2024-03-13 RX ORDER — CHLORHEXIDINE GLUCONATE ORAL RINSE 1.2 MG/ML
15 SOLUTION DENTAL ONCE
Status: COMPLETED | OUTPATIENT
Start: 2024-03-13 | End: 2024-03-13

## 2024-03-13 RX ADMIN — SODIUM CHLORIDE, SODIUM LACTATE, POTASSIUM CHLORIDE, AND CALCIUM CHLORIDE 125 ML/HR: .6; .31; .03; .02 INJECTION, SOLUTION INTRAVENOUS at 07:34

## 2024-03-13 RX ADMIN — SODIUM CHLORIDE 8 MCG: 9 INJECTION, SOLUTION INTRAVENOUS at 09:17

## 2024-03-13 RX ADMIN — PROPOFOL 120 MCG/KG/MIN: 10 INJECTION, EMULSION INTRAVENOUS at 08:56

## 2024-03-13 RX ADMIN — HYDROMORPHONE HYDROCHLORIDE 0.5 MG: 1 INJECTION, SOLUTION INTRAMUSCULAR; INTRAVENOUS; SUBCUTANEOUS at 09:02

## 2024-03-13 RX ADMIN — PROPOFOL 200 MG: 10 INJECTION, EMULSION INTRAVENOUS at 08:56

## 2024-03-13 RX ADMIN — FENTANYL CITRATE 50 MCG: 50 INJECTION INTRAMUSCULAR; INTRAVENOUS at 09:38

## 2024-03-13 RX ADMIN — ONDANSETRON 4 MG: 2 INJECTION INTRAMUSCULAR; INTRAVENOUS at 09:01

## 2024-03-13 RX ADMIN — FENTANYL CITRATE 50 MCG: 50 INJECTION INTRAMUSCULAR; INTRAVENOUS at 09:11

## 2024-03-13 RX ADMIN — EPHEDRINE SULFATE 5 MG: 50 INJECTION, SOLUTION INTRAVENOUS at 09:58

## 2024-03-13 RX ADMIN — KETOROLAC TROMETHAMINE 30 MG: 30 INJECTION, SOLUTION INTRAMUSCULAR; INTRAVENOUS at 09:39

## 2024-03-13 RX ADMIN — LIDOCAINE HYDROCHLORIDE 50 MG: 10 INJECTION, SOLUTION EPIDURAL; INFILTRATION; INTRACAUDAL; PERINEURAL at 08:56

## 2024-03-13 RX ADMIN — MIDAZOLAM 2 MG: 1 INJECTION INTRAMUSCULAR; INTRAVENOUS at 08:50

## 2024-03-13 RX ADMIN — SODIUM CHLORIDE, SODIUM LACTATE, POTASSIUM CHLORIDE, AND CALCIUM CHLORIDE 1000 ML: .6; .31; .03; .02 INJECTION, SOLUTION INTRAVENOUS at 10:17

## 2024-03-13 RX ADMIN — SODIUM CHLORIDE 8 MCG: 9 INJECTION, SOLUTION INTRAVENOUS at 09:01

## 2024-03-13 RX ADMIN — BUPIVACAINE HYDROCHLORIDE 30 ML: 2.5 INJECTION, SOLUTION EPIDURAL; INFILTRATION; INTRACAUDAL; PERINEURAL at 11:13

## 2024-03-13 RX ADMIN — SODIUM CHLORIDE 8 MCG: 9 INJECTION, SOLUTION INTRAVENOUS at 08:59

## 2024-03-13 RX ADMIN — DEXAMETHASONE SODIUM PHOSPHATE 10 MG: 10 INJECTION, SOLUTION INTRAMUSCULAR; INTRAVENOUS at 08:58

## 2024-03-13 RX ADMIN — CEFAZOLIN SODIUM 2000 MG: 2 SOLUTION INTRAVENOUS at 08:53

## 2024-03-13 RX ADMIN — CHLORHEXIDINE GLUCONATE 15 ML: 1.2 SOLUTION ORAL at 07:33

## 2024-03-13 NOTE — DISCHARGE INSTR - AVS FIRST PAGE
POSTOPERATIVE INSTRUCTIONS    MEDICATIONS:  Resume all home medications unless otherwise instructed by your surgeon.  Pain Medication:  Oxycodone 5 mg, 1 tablets every 4 hours as needed  If you were given a regional anesthetic (nerve block), please begin taking the pain medication as soon as you get home, even if you have minimal or no pain.  DO NOT WAIT FOR THE NERVE BLOCK TO WEAR OFF.  Possible side effects include nausea, constipation, and urinary retention.  If you experience these side effects, please call our office for assistance.  Pain med refills are authorized only during office hours (8am-4pm, Mon-Fri).  Anti-Inflammatory:  Tylenol 325 mg, 1-2 tablets every 6 hours and Naproxen 500 mg, 1 tablet every 12 hours  Take with food.  Stop if you experience nausea, reflux, or stomach pain.    WOUND CARE:  Keep the dressing clean and dry.  Light drainage may occur the first 2 days postop.  You may remove the dressings and get the incision wet in the shower 72 hours after surgery.  DO NOT remove steri-strips or sutures.  DO NOT immerse the incision under water.  Carefully pat the incision dry.  If there is wound drainage, re-apply a fresh dry gauze dressing.  Please call our office ([phone]) if you experience any of the following:  Sudden increase in swelling, redness, or warmth at the surgical site  Excessive incisional drainage that persists beyond the 3rd day after surgery  Oral temperature greater than 101 degrees, not relieved with Tylenol  Shortness of breath, chest pain, nausea, or any other concerning symptoms    SWELLING CONTROL:  Cold Therapy:  Apply ice (20 min on, 20 min off) as often as you feel is necessary.  Elevation:  Elevate the entire leg above heart level as much as possible.  Compression:  Apply ACE wraps or a compression stocking as needed.    RANGE OF MOTION:  You are allowed FULL RANGE OF MOTION as tolerated.    IMMOBILIZATION:  None.  You are allowed full range of motion as  "tolerated.    ACTIVITY:  BEAR FULL WEIGHT AS TOLERATED on the operative leg.  Use crutches to assist only as needed.  Using Crutches on Stairs:  Going up, lead with your \"good\" (nonoperative) leg.  Going down, lead with your \"bad\" (operative) leg.  Use a hand rail when available.  Quad Sets:  Sit or lie with your knee straight.  Tighten your quadriceps (front thigh) muscle.  Hold for 3 seconds, then relax.  Repeat 20 times per hour while awake.    PHYSICAL THERAPY:  Begin therapy on THE DAY AFTER SURGERY.  You were given a prescription for therapy at your preoperative office visit.  If you do not have physical therapy scheduled yet, please call our office for assistance.    FOLLOW-UP APPOINTMENT:  10-14 days after surgery with:    Dr. Deo Alcantar MD  "

## 2024-03-13 NOTE — ANESTHESIA POSTPROCEDURE EVALUATION
Post-Op Assessment Note    CV Status:  Stable    Pain management: adequate    Multimodal analgesia used between 6 hours prior to anesthesia start to PACU discharge    Mental Status:  Sleepy   PONV Controlled:  None   Airway Patency:  Patent     Post Op Vitals Reviewed: Yes    No anethesia notable event occurred.    Staff: CRNA   Comments: Patient administered ephedrine 5mg in recovery by CRNA. BP improved prior to departure of CRNA.              BP 89/52   Temp 98.9 °F (37.2 °C) (03/13/24 1000)    Pulse 70 (03/13/24 1000)   Resp 12 (03/13/24 1000)    SpO2 99 % (03/13/24 1000)

## 2024-03-13 NOTE — OP NOTE
OPERATIVE REPORT  PATIENT NAME: Ifeanyi Shin Jr.    :  2005  MRN: [id number]  Pt Location: [facility] OR ROOM 06    SURGERY DATE: 3/13/2024    Surgeons and Role:     * Deo Alcantar MD - Primary     * Bong Han PA-C - Assisting     * Radha Muñiz MD - Fellow    Preop Diagnosis:  Fibrosis of left knee joint [M24.662]    Post-Op Diagnosis Codes:     * Fibrosis of left knee joint [M24.662]    Procedure(s):  Left - ARTHROSCOPY KNEE for lysis of adhesive lesions and debridement.manipulation under anesthesia.  Left - MANIPULATION JOINT KNEE    Specimen(s):  * No specimens in log *    Estimated Blood Loss:   Minimal    Drains:  * No LDAs found *    Anesthesia Type:   General    Operative Indications:  Fibrosis of left knee joint [M24.662]    Operative Findings:  As expected     Complications:   None    Procedure and Technique:  SURGEON(S) AND ROLE  Deo Alcantar      PROCEDURE(S)   left KNEE:  1. Left knee arthroscopy, lysis of adhesions and extensive debridement of arthrofibrosis  2. Manipulation under anesthesia     WOUND TYPE: 1     DRAINS  None.     COMPLICATIONS  None.     FINDINGS:  See below      ANESTHESIA TYPE  Regional + LMA     IMPLANTS:  Nothing implanted     ESTIMATED BLOOD LOSS: < 25 ml    SPECIMENS: none     INDICATION:  Ifeanyi Shin Jr. is a 19 y.o. male who was diagnosed with postoperatve arthrofibrosis based on history, examination, and advanced imaging. Accordingly, after a lengthy discussion of the risks and benefits and alternatives to both operative and non-operative treatment, he elected to proceed with operative intervention, and I am in agreement. Please see my clinical documentation for additional details on the history, exam, and discussion regarding surgical decision making.     PRE-PROCEDURE PROTOCOL:  The patient was identified in the preoperative holding area where informed consent and surgical site marking were confirmed. The patient was then transferred to  the operating room where anesthesia was administered by our Anesthesia colleagues in accordance with our preoperative plan. The operative extremity was prepped and draped in the usual sterile fashion. A procedural pause was performed to verify correct patient identification, procedure to be performed, laterality, agreement of all team members, availability of all equipment, and administration of preoperative antibiotics. Afterwards, the procedure commenced.     DESCRIPTION OF PROCEDURE  Examination of the knee under anesthesia was performed. This revealed 1A lachman with 0-85 degrees of motion. Using gentle pressure with flexion, the knee was carefully manually manipulated to 135 degrees of flexion.      Diagnostic arthroscopy was performed by inserting the camera through an inferolateral portal. An inferomedial portal was created under needle localization. Findings were as follows:    There was extensive scar tissue in the patellofemoral and anterior knee that was gently debrided with a shaver and an arthroscopic bovie to release scar bands and debride synovitis and arthrofibrosis.       Medial Compartment: Meniscus and cartilage intact and within normal limits     Lateral compartment: Meniscus and cartilage intact and within normal limits     Trochlea: Cartilage intact and within normal limits.      Patella: Cartilage intact and within normal limits     There was no evidence of loose bodies or other pathology in the suprapatellar pouch, medial gutter, or lateral gutter.     The arthroscopic equipment was removed and fluid drained from the knee. The wound was closed in a standard fashion. A sterile dressing was applied. The patient was transferred to the PACU where she recovered without complication.     POSTOPERATIVE PLAN  DVT PROPHYLAXIS: Early WBAT and mobilization     POSTOPERATIVE REHABILITATION: WBAT, early and frequent visits with PT to keep range of motion.       FOLLOW-UP: We will plan on seeing him back  in approximately 1-2 weeks for wound check, sutures out and advancement of physical therapy as indicated.     A physician assistant was required during the procedure for retraction, tissue handling, dissection and suturing. There was no qualified resident available to assist       Patient Disposition:  PACU      I was present for the entire procedure.    Patient Disposition:  PACU         SIGNATURE: Deo Alcantar MD  DATE: March 13, 2024  TIME: 12:14 PM

## 2024-03-13 NOTE — ANESTHESIA PREPROCEDURE EVALUATION
Procedure:  ARTHROSCOPY KNEE for lysis of adhesive lesions,manipulation under anesthesia, plus all other indicated procedures (Left: Knee)  MANIPULATION JOINT KNEE (Left: Knee)    Relevant Problems   No relevant active problems   H/o ACL repair and tosillectomy now presenting for above procedure      Physical Exam    Airway    Mallampati score: II  TM Distance: >3 FB  Neck ROM: full     Dental       Cardiovascular  Cardiovascular exam normal    Pulmonary  Pulmonary exam normal     Other Findings    Anesthesia Plan  ASA Score- 1     Anesthesia Type- general with ASA Monitors.         Additional Monitors:     Airway Plan: LMA.           Plan Factors-Exercise tolerance (METS): >4 METS.    Chart reviewed.   Existing labs reviewed. Patient summary reviewed.    Patient is not a current smoker.              Induction- intravenous.    Postoperative Plan- Plan for postoperative opioid use.     Informed Consent- Anesthetic plan and risks discussed with patient and mother.  I personally reviewed this patient with the CRNA. Discussed and agreed on the Anesthesia Plan with the CRNA..

## 2024-03-13 NOTE — ANESTHESIA PROCEDURE NOTES
Peripheral Block    Patient location during procedure: PACU  Start time: 3/13/2024 11:13 AM  Reason for block: at surgeon's request and post-op pain management  Staffing  Performed by: Rodney Thompson MD  Authorized by: Rodney Thompson MD    Preanesthetic Checklist  Completed: patient identified, IV checked, site marked, risks and benefits discussed, surgical consent, monitors and equipment checked, pre-op evaluation and timeout performed  Peripheral Block  Patient position: supine  Prep: ChloraPrep  Patient monitoring: frequent blood pressure checks, continuous pulse oximetry and heart rate  Anesthesia block type: Intra-articular Knee injection.  Laterality: left  Injection technique: single-shot  Procedures: ultrasound guided, Ultrasound guidance required for the procedure to increase accuracy and safety of medication placement and decrease risk of complications.  Ultrasound permanent image savedbupivacaine (PF) (MARCAINE) 0.25 % injection 20 mL - Perineural   30 mL - 3/13/2024 11:13:00 AM  Needle  Needle type: Stimuplex   Needle gauge: 20 G  Needle length: 4 in  Needle localization: anatomical landmarks and ultrasound guidance  Assessment  Injection assessment: incremental injection, frequent aspiration, injected with ease, negative aspiration, negative for heart rate change, no paresthesia on injection, no symptoms of intraneural/intravenous injection and needle tip visualized at all times  Paresthesia pain: none  Post-procedure:  site cleaned  patient tolerated the procedure well with no immediate complications

## 2024-03-14 ENCOUNTER — OFFICE VISIT (OUTPATIENT)
Dept: PHYSICAL THERAPY | Facility: OTHER | Age: 19
End: 2024-03-14
Payer: COMMERCIAL

## 2024-03-14 DIAGNOSIS — Z87.39 S/P ARTHROSCOPIC RECONSTRUCTION OF ACL OF RIGHT KNEE USING QUADRICEPS TENDON AUTOGRAFT: Primary | ICD-10-CM

## 2024-03-14 DIAGNOSIS — Z98.890 S/P ARTHROSCOPIC RECONSTRUCTION OF ACL OF RIGHT KNEE USING QUADRICEPS TENDON AUTOGRAFT: Primary | ICD-10-CM

## 2024-03-14 PROCEDURE — 97110 THERAPEUTIC EXERCISES: CPT | Performed by: PHYSICAL THERAPIST

## 2024-03-14 PROCEDURE — 97164 PT RE-EVAL EST PLAN CARE: CPT | Performed by: PHYSICAL THERAPIST

## 2024-03-14 PROCEDURE — 97140 MANUAL THERAPY 1/> REGIONS: CPT | Performed by: PHYSICAL THERAPIST

## 2024-03-15 ENCOUNTER — OFFICE VISIT (OUTPATIENT)
Dept: PHYSICAL THERAPY | Facility: OTHER | Age: 19
End: 2024-03-15
Payer: COMMERCIAL

## 2024-03-15 DIAGNOSIS — Z87.39 S/P ARTHROSCOPIC RECONSTRUCTION OF ACL OF RIGHT KNEE USING QUADRICEPS TENDON AUTOGRAFT: Primary | ICD-10-CM

## 2024-03-15 DIAGNOSIS — Z98.890 S/P ARTHROSCOPIC RECONSTRUCTION OF ACL OF RIGHT KNEE USING QUADRICEPS TENDON AUTOGRAFT: Primary | ICD-10-CM

## 2024-03-15 PROCEDURE — 97110 THERAPEUTIC EXERCISES: CPT

## 2024-03-15 NOTE — PROGRESS NOTES
"Daily Note   Today's date: 2024  Patient name: Ifeanyi Shin Jr.  : 2005  MRN: [id number]  Referring provider: Tony Snow MD  Dx:   Encounter Diagnosis     ICD-10-CM    1. S/P arthroscopic reconstruction of ACL of left knee using quadriceps tendon autograft  Z98.890     Z87.39               Start Time: 0753  Stop Time: 0816  Total time in clinic (min): 23 minutes    Subjective: Pt reports L knee is \"sore, not painful.\"    Objective: See treatment diary below    Knee Flexion: 123 deg    Assessment: Tolerated treatment well.  Patient presents POD 2 s/p YANIRA & removal of scar tissue. L knee PROM improvements to 123 deg today. Patient demonstrated fatigue post treatment, exhibited good technique with therapeutic exercises, and would benefit from continued PT to improve range of motion and strength.     Plan: Continue per plan of care.     Precautions: ACL Reconstruction performed on 10/6     Daily Treatment Log  Manuals  3/4 3/15   Tib Glides  GRC SFP       GISTM   GRC  EW MP     MFDc   GRC        PROM  GRC SFP EW MP GRC    TKE Stretching  GRC SFP EW MP GRC SFP   Neuro Re-Ed      3    Wall Sits          Pball Crushers          Alt UE/Alt LE          Clamshells          Heel Raised Wall Squats   3x45\"       TKE [place]          Yi Squats 10 x 40\"          BFR  Wall Sits  DL Leg Press #75  Sup HS Curls   SL RDLs    Wall Sits  DL Leg Press #75  Pball HS Curls   SL RDLs 15# Wall Sits 1' x 4  DL Leg Press #75  Pball HS Curls  SL RDL 15# SL Press #35  DL Press #90  Pball HS Curls   SL RDL #20KB    Ther Ex      10 min   Bike    10 min 10 min   10x20'   Heel slides          Wall Slides          Wall Slides Weighted Opp Leg   MHP 10'  Opp Leg   MHP 10'  15#  MHP 10'   15# MHP 10' MHP 10'  5# 15x30\"    Heel Prop          EOT Flexion Stretch           Standing clamshell / Lateral band walk          Double Leg Press          B-stance goblet squat          Split " "squat   Heel-elevated 3x12 4\" ecc       Retro deadmill 5 min  5 min 5 min       Couch Stretch 30 x 10\"  30 x 10\"  30 x 10\"       Supine Pball HS Curls           Prone quad S c strap       3x60\"   Ziyad S c strap       3x30\"   Ther Activity                              Gait Training                              Modalities                                "

## 2024-03-17 NOTE — PROGRESS NOTES
"Daily Note   Today's date: 2024  Patient name: Ifeanyi Shin Jr.  : 2005  MRN: [id number]  Referring provider: Tony Snow MD  Dx:   Encounter Diagnosis     ICD-10-CM    1. S/P arthroscopic reconstruction of ACL of left knee using quadriceps tendon autograft  Z98.890     Z87.39                 Start Time: 08  Stop Time: 08  Total time in clinic (min): 38 minutes    Subjective: Pt reports he was able to achieve 129 deg of knee flexion over the weekend.     Objective: See treatment diary below    Knee Flexion: 131 deg    Assessment: Tolerated treatment well.  Patient presents POD 5 s/p YANIRA & removal of scar tissue. L knee PROM improvements to 131 deg today. Patient demonstrated fatigue post treatment, exhibited good technique with therapeutic exercises, and would benefit from continued PT to improve range of motion and strength.     Plan: Continue per plan of care.     Precautions: ACL Reconstruction performed on 10/6     Daily Treatment Log  Manuals 2/28 2/29 3/4 3/15 3/18   Tib Glides        GISTM  EW MP      MFDc         PROM EW MP GRC  SFP   TKE Stretching EW MP GRC SFP    Neuro Re-Ed    3/4     Wall Sits        Pball Crushers        Alt UE/Alt LE        Clamshells        Heel Raised Wall Squats        TKE [place]        [place] Squats        BFR  Wall Sits  DL Leg Press #75  Pball HS Curls   SL RDLs 15# Wall Sits 1' x 4  DL Leg Press #75  Pball HS Curls  SL RDL 15# SL Press #35  DL Press #90  Pball HS Curls   SL RDL #20KB     Ther Ex        Bike  10 min   10 min 10 min   Heel slides    10x20' 5x60\"   Wall Slides        Wall Slides Weighted  15# MHP 10' MHP 10'  5# 15x30\"  5# 10x30\"   Supine knee flex S c hip flexed 90     5x60\"   Heel Prop     Calf S c strap 10x10\"   EOT Flexion Stretch         Standing clamshell / Lateral band walk        Double Leg Press        B-stance goblet squat        Split squat        Retro deadmill        Couch Stretch        Supine Pball HS Curls         Prone " "quad S c strap    3x60\"    Ziyad VALLES c strap    3x30\"    FFE P-A ankle mobilization c band                Ther Activity                        Gait Training                        Modalities                          "

## 2024-03-17 NOTE — PROGRESS NOTES
"Daily Note   Today's date: 2024  Patient name: Ifeanyi Shin Jr.  : 2005  MRN: [id number]  Referring provider: Tony Snow MD  Dx:   Encounter Diagnosis     ICD-10-CM    1. S/P arthroscopic reconstruction of ACL of left knee using quadriceps tendon autograft  Z98.890     Z87.39           IEP [id number]  1 on 1 [id number]             Subjective: Patient reports he will be undergoing a manipulation next week due to his decreased ROM post ACL surgery.      Objective: See treatment diary below  Knee Flexion: 105    Assessment: Tolerated treatment well.  PT is ordering home estim unit for disuse atrophy in the left quad to increase strength and function.  Patient has decreased AROM in the left quad.  His MMT is a 3/5.   Patient demonstrated fatigue post treatment, exhibited good technique with therapeutic exercises, and would benefit from continued PT to improve range of motion and strength. Most fatigued with pball curls.    Plan: Continue per plan of care.  Progress [place].    Precautions: ACL Reconstruction performed on 10/6     Daily Treatment Log  Manuals  3/4   Tib Glides  GRC SFP      GISTM   GRC  EW MP    MFDc   GRC       PROM  GRC SFP EW MP GRC   TKE Stretching  GRC SFP EW MP GRC   Neuro Re-Ed      3/4   Wall Sits         Pball Crushers         Alt UE/Alt LE         Clamshells         Heel Raised Wall Squats   3x45\"      TKE [place]         [place] Squats 10 x 40\"         BFR  Wall Sits  DL Leg Press #75  Sup HS Curls   SL RDLs    Wall Sits  DL Leg Press #75  Pball HS Curls   SL RDLs 15# Wall Sits 1' x 4  DL Leg Press #75  Pball HS Curls  SL RDL 15# SL Press #35  DL Press #90  Pball HS Curls   SL RDL #20KB   Ther Ex        Bike    10 min 10 min     Wall Slides         Wall Slides Weighted Opp Leg   MHP 10'  Opp Leg   MHP 10'  15#  MHP 10'   15# MHP 10' MHP 10'    Heel Prop         EOT Flexion Stretch          Standing clamshell / Lateral band walk       " "  Double Leg Press         B-stance goblet squat         Split squat   Heel-elevated 3x12 4\" ecc      Retro deadmill 5 min  5 min 5 min      Couch Stretch 30 x 10\"  30 x 10\"  30 x 10\"      Supine Pball HS Curls          Ther Activity                           Gait Training                           Modalities                             " "    Psoas Release        Neuro Re-Ed  3/14       NMES QS        NMES TC        BFR         Ther Ex 3/14       Bike  10 min       Heel slides 30 x 10\"        Wall Slides        Wall Slides Weighted MHP 10'        Supine knee flex S c hip flexed 90        Heel Prop        Squat with HR and Table Assist        EOT Flexion Stretch         Standing clamshell / Lateral band walk        Double Leg Press        B-stance goblet squat        Split squat        Retro deadmill        Couch Stretch        Supine Pball HS Curls         Prone quad S c strap        Ziyad S c strap        FFE P-A ankle mobilization c band                Ther Activity                        Gait Training                        Modalities                          "

## 2024-03-18 ENCOUNTER — OFFICE VISIT (OUTPATIENT)
Dept: PHYSICAL THERAPY | Facility: OTHER | Age: 19
End: 2024-03-18
Payer: COMMERCIAL

## 2024-03-18 DIAGNOSIS — Z87.39 S/P ARTHROSCOPIC RECONSTRUCTION OF ACL OF RIGHT KNEE USING QUADRICEPS TENDON AUTOGRAFT: Primary | ICD-10-CM

## 2024-03-18 DIAGNOSIS — Z98.890 S/P ARTHROSCOPIC RECONSTRUCTION OF ACL OF RIGHT KNEE USING QUADRICEPS TENDON AUTOGRAFT: Primary | ICD-10-CM

## 2024-03-18 PROCEDURE — 97110 THERAPEUTIC EXERCISES: CPT

## 2024-03-20 ENCOUNTER — OFFICE VISIT (OUTPATIENT)
Dept: PHYSICAL THERAPY | Facility: OTHER | Age: 19
End: 2024-03-20
Payer: COMMERCIAL

## 2024-03-20 DIAGNOSIS — Z98.890 S/P ARTHROSCOPIC RECONSTRUCTION OF ACL OF RIGHT KNEE USING QUADRICEPS TENDON AUTOGRAFT: Primary | ICD-10-CM

## 2024-03-20 DIAGNOSIS — Z87.39 S/P ARTHROSCOPIC RECONSTRUCTION OF ACL OF RIGHT KNEE USING QUADRICEPS TENDON AUTOGRAFT: Primary | ICD-10-CM

## 2024-03-20 PROCEDURE — 97110 THERAPEUTIC EXERCISES: CPT

## 2024-03-20 NOTE — PROGRESS NOTES
"Daily Note   Today's date: 2024  Patient name: Ifeanyi Shin Jr.  : 2005  MRN: [id number]  Referring provider: Tony Snow MD  Dx:   Encounter Diagnosis     ICD-10-CM    1. S/P arthroscopic reconstruction of ACL of left knee using quadriceps tendon autograft  Z98.890     Z87.39                 Start Time: 1330  Stop Time: 1358  Total time in clinic (min): 28 minutes    Subjective: Pt feels like the knee is \"loosening up.\"    Objective: See treatment diary below    Knee Flexion: 132 deg    Assessment: Tolerated treatment well. L knee PROM improvements to 132 deg today during heel slides. Patient demonstrated fatigue post treatment, exhibited good technique with therapeutic exercises, and would benefit from continued PT to improve range of motion and strength.     Plan: Continue per plan of care.     Precautions: ACL Reconstruction performed on 10/6     Daily Treatment Log  Manuals 2/28 2/29 3/4 3/15 3/18 3/20   Tib Glides         GISTM  EW MP       MFDc          PROM EW MP GRC  SFP SFP   TKE Stretching EW MP GRC SFP     Neuro Re-Ed    3/4      Wall Sits         Pball Crushers         Alt UE/Alt LE         Clamshells         Heel Raised Wall Squats         TKE [place]         [place] Squats         BFR  Wall Sits  DL Leg Press #75  Pball HS Curls   SL RDLs 15# Wall Sits 1' x 4  DL Leg Press #75  Pball HS Curls  SL RDL 15# SL Press #35  DL Press #90  Pball HS Curls   SL RDL #20KB      Ther Ex         Bike  10 min   10 min 10 min 10 min   Heel slides    10x20' 5x60\" 5x60\"   Wall Slides         Wall Slides Weighted  15# MHP 10' MHP 10'  5# 15x30\"  5# 10x30\" 10 min on/off c MHP L quad   Supine knee flex S c hip flexed 90     5x60\" 5x60\"   Heel Prop     Calf S c strap 10x10\" Calf S c strap 10x10\"   EOT Flexion Stretch          Standing clamshell / Lateral band walk         Double Leg Press         B-stance goblet squat         Split squat         Retro deadmill         Couch Stretch         Supine " "[place] SANDOVAL Curls          Prone quad S c strap    3x60\"     Ziyad S c strap    3x30\"     FFE P-A ankle mobilization c band                  Ther Activity                           Gait Training                           Modalities                             "

## 2024-03-21 ENCOUNTER — OFFICE VISIT (OUTPATIENT)
Dept: PHYSICAL THERAPY | Facility: OTHER | Age: 19
End: 2024-03-21
Payer: COMMERCIAL

## 2024-03-21 DIAGNOSIS — Z87.39 S/P ARTHROSCOPIC RECONSTRUCTION OF ACL OF RIGHT KNEE USING QUADRICEPS TENDON AUTOGRAFT: Primary | ICD-10-CM

## 2024-03-21 DIAGNOSIS — Z98.890 S/P ARTHROSCOPIC RECONSTRUCTION OF ACL OF RIGHT KNEE USING QUADRICEPS TENDON AUTOGRAFT: Primary | ICD-10-CM

## 2024-03-21 PROCEDURE — 97110 THERAPEUTIC EXERCISES: CPT | Performed by: PHYSICAL THERAPIST

## 2024-03-24 NOTE — PROGRESS NOTES
"Daily Note   Today's date: 2024  Patient name: Ifeanyi Shin Jr.  : 2005  MRN: [id number]  Referring provider: Tony Snow MD  Dx:   Encounter Diagnosis     ICD-10-CM    1. S/P arthroscopic reconstruction of ACL of left knee using quadriceps tendon autograft  Z98.890     Z87.39         1 on 1 [id number]  IEP [id number]  Start Time: 1500  Stop Time: 1600  Total time in clinic (min): 60 minutes    Subjective: Patient said he has been doing his exercises at home on a regular basis.  Patient reports his ROM is significantly improving.       Objective: See treatment diary below    Knee Flexion: 136 deg    Assessment: Tolerated treatment well. L knee PROM improvements to 136 deg today during heel slides. Patient demonstrated fatigue post treatment, exhibited good technique with therapeutic exercises, and would benefit from continued PT to improve range of motion and strength.     Plan: Continue per plan of care.     Precautions: ACL Reconstruction performed on 10/6     Daily Treatment Log  Manuals 3/4 3/14 3/15 3/18 3/20 3/21   Tib Glides         GISTM          MFDc          PROM GRC GR  SFP SFP GR   TKE Stretching GR GR SFP   GRC   Psoas Release      GR   Neuro Re-Ed  3/4 3/14 3/15 3/18 3/20 3/21   NMES QS      10'    NMES TC      10'   BFR  SL Press #35  DL Press #90  Pball HS Curls   SL RDL #20KB        Ther Ex 3/4 3/14 3/15 3/18 3/20 3/21   Bike   10 min 10 min 10 min 10 min 10'    Heel slides  30 x 10\"  10x20' 5x60\" 5x60\" 10 x 60\"    Wall Slides         Wall Slides Weighted MHP 10'  MHP 10'  5# 15x30\"  5# 10x30\" 10 min on/off c MHP L quad 10 min on/off c MHP L quad   Supine knee flex S c hip flexed 90    5x60\" 5x60\"    Heel Prop    Calf S c strap 10x10\" Calf S c strap 10x10\"    Squat with HR and Table Assist      10 x 60\"    EOT Flexion Stretch          Standing clamshell / Lateral band walk         Double Leg Press         B-stance goblet squat         Split squat         Retro deadmill      " "   Couch Stretch         Supine Pball HS Curls          Prone quad S c strap   3x60\"   5 x 60\"    Ziyad S c strap   3x30\"   5 x 60\"    FFE P-A ankle mobilization c band                  Ther Activity                           Gait Training                           Modalities                             "

## 2024-03-25 ENCOUNTER — OFFICE VISIT (OUTPATIENT)
Dept: PHYSICAL THERAPY | Facility: OTHER | Age: 19
End: 2024-03-25
Payer: COMMERCIAL

## 2024-03-25 DIAGNOSIS — Z98.890 S/P ARTHROSCOPIC RECONSTRUCTION OF ACL OF RIGHT KNEE USING QUADRICEPS TENDON AUTOGRAFT: Primary | ICD-10-CM

## 2024-03-25 DIAGNOSIS — Z87.39 S/P ARTHROSCOPIC RECONSTRUCTION OF ACL OF RIGHT KNEE USING QUADRICEPS TENDON AUTOGRAFT: Primary | ICD-10-CM

## 2024-03-25 PROCEDURE — 97110 THERAPEUTIC EXERCISES: CPT

## 2024-03-25 PROCEDURE — 97140 MANUAL THERAPY 1/> REGIONS: CPT

## 2024-03-25 NOTE — PROGRESS NOTES
"Daily Note   Today's date: 2024  Patient name: Ifeanyi Shin Jr.  : 2005  MRN: [id number]  Referring provider: Tony Snow MD  Dx:   Encounter Diagnosis     ICD-10-CM    1. S/P arthroscopic reconstruction of ACL of left knee using quadriceps tendon autograft  Z98.890     Z87.39             Start Time: 1138  Stop Time: 1216  Total time in clinic (min): 38 minutes    Subjective: Patient reports he has been consistent with home exercises.     Objective: See treatment diary below    Knee Flexion: 136 deg    Assessment: Tolerated treatment well. L knee PROM improvements to 136 deg today during heel slides. Patient demonstrated fatigue post treatment, exhibited good technique with therapeutic exercises, and would benefit from continued PT to improve range of motion and strength.     Plan: Continue per plan of care.     Precautions: ACL Reconstruction performed on 10/6     Daily Treatment Log  Manuals 3/4 3/14 3/15 3/18 3/20 3/21 3/25   Tib Glides          GISTM           MFDc           PROM GRC GRC  SFP SFP GRC SFP   TKE Stretching GRC GRC SFP   GRC    Psoas Release      GR    Neuro Re-Ed  3/4 3/14 3/15 3/18 3/20 3/21 3/25   NMES QS      10'     NMES TC      10'    BFR  SL Press #35  DL Press #90  Pball HS Curls   SL RDL #20KB         Ther Ex 3/4 3/14 3/15 3/18 3/20 3/21 3/25   Bike   10 min 10 min 10 min 10 min 10'  10'   Heel slides  30 x 10\"  10x20' 5x60\" 5x60\" 10 x 60\"  10 x 60\"    Wall Slides          Wall Slides Weighted MHP 10'  MHP 10'  5# 15x30\"  5# 10x30\" 10 min on/off c MHP L quad 10 min on/off c MHP L quad 10 min on/off c MHP L quad   Supine knee flex S c hip flexed 90    5x60\" 5x60\"     Heel Prop    Calf S c strap 10x10\" Calf S c strap 10x10\"  Calf S c strap 10x10\"   Squat with HR and Table Assist      10 x 60\"  10 x 30\"    EOT Flexion Stretch           Standing clamshell / Lateral band walk          TKE       MTB 20x5\"   Double Leg Press          B-stance goblet squat          Split " "squat          Retro deadmill          Couch Stretch          Supine Pball HS Curls           Prone quad S c strap   3x60\"   5 x 60\"     Ziyad S c strap   3x30\"   5 x 60\"  5x30\"   FFE P-A ankle mobilization c band                    Ther Activity                              Gait Training                              Modalities                                " Detail Level: Zone Additional Notes: Patient will schedule after spring release for micro needling procedure.

## 2024-03-26 ENCOUNTER — OFFICE VISIT (OUTPATIENT)
Dept: OBGYN CLINIC | Facility: CLINIC | Age: 19
End: 2024-03-26

## 2024-03-26 VITALS
SYSTOLIC BLOOD PRESSURE: 115 MMHG | HEART RATE: 88 BPM | BODY MASS INDEX: 29.35 KG/M2 | WEIGHT: 205.03 LBS | HEIGHT: 70 IN | DIASTOLIC BLOOD PRESSURE: 80 MMHG

## 2024-03-26 DIAGNOSIS — M24.662 FIBROSIS OF LEFT KNEE JOINT: Primary | ICD-10-CM

## 2024-03-26 PROCEDURE — 99024 POSTOP FOLLOW-UP VISIT: CPT | Performed by: ORTHOPAEDIC SURGERY

## 2024-03-26 NOTE — PROGRESS NOTES
Subjective   CHIEF COMPLAINT/REASON FOR VISIT  Ifeanyi Shin Jr. is a 19 y.o. male who presents for 2 week post op follow-up after ARTHROSCOPY KNEE for lysis of adhesive lesions and debridement,manipulation under anesthesia, - Left and Manipulation Joint Knee - Left on 3/13/2024     HISTORY OF PRESENT ILLNESS  Overall, he feels things are going well and progressing appropriately. Pain has been well controlled. He has been following the post-operative rehabilitation regimen without difficultly. Currently, he is doing well. Patient has been working with PT which has gone well.    Objective   PHYSICAL EXAMINATION  Musculoskeletal: left Knee Examination:  General: The patient is alert, oriented, and pleasant to interact with.  Patient ambulates with antalgic gait pattern  Assistive Device: No  Alignment: normal  Skin is warm and dry to touch with no signs of erythema, ecchymosis, or infection   Effusion: none  ROM: 0° - 110°  verus 0° - 135° on contralateral side  MMT: deferred  TTP: None  Flexor and extensor mechanisms are intact   Knee is stable to varus and valgus stress  2+ DP and PT pulses with brisk capillary refill to the toes  Sural, saphenous, tibial, superficial, and deep peroneal motor and sensory distributions intact  Sensation light touch intact distally    Assessment/Plan   1. Status Post ARTHROSCOPY KNEE for lysis of adhesive lesions and debridement,manipulation under anesthesia, - Left and Manipulation Joint Knee - Left    He is doing well after surgery and making appropriate progress. Encouraged to continue to work with PT to improve ROM.    We will plan to see him back at the  8-10 week post-operative yuval. If any issues, questions, or concerns arise between now and the next appointment, we have encouraged the patient contact our team.        Scribe Attestation      I,:  Bong Han PA-C am acting as a scribe while in the presence of the attending physician.:       I,:  Deo Alcantar MD  personally performed the services described in this documentation    as scribed in my presence.:

## 2024-03-28 ENCOUNTER — OFFICE VISIT (OUTPATIENT)
Dept: PHYSICAL THERAPY | Facility: OTHER | Age: 19
End: 2024-03-28
Payer: COMMERCIAL

## 2024-03-28 DIAGNOSIS — Z87.39 S/P ARTHROSCOPIC RECONSTRUCTION OF ACL OF RIGHT KNEE USING QUADRICEPS TENDON AUTOGRAFT: Primary | ICD-10-CM

## 2024-03-28 DIAGNOSIS — Z98.890 S/P ARTHROSCOPIC RECONSTRUCTION OF ACL OF RIGHT KNEE USING QUADRICEPS TENDON AUTOGRAFT: Primary | ICD-10-CM

## 2024-03-28 PROCEDURE — 97110 THERAPEUTIC EXERCISES: CPT | Performed by: PHYSICAL THERAPIST

## 2024-03-28 PROCEDURE — 97140 MANUAL THERAPY 1/> REGIONS: CPT | Performed by: PHYSICAL THERAPIST

## 2024-03-31 NOTE — PROGRESS NOTES
"Daily Note   Today's date: 2024  Patient name: Ifeanyi Shin Jr.  : 2005  MRN: [id number]  Referring provider: Tony Snow MD  Dx:   Encounter Diagnosis     ICD-10-CM    1. S/P arthroscopic reconstruction of ACL of left knee using quadriceps tendon autograft  Z98.890     Z87.39                        Subjective: Patient reports he has been consistent with home exercises.     Objective: See treatment diary below    Knee Flexion: 136 deg    Assessment: Tolerated treatment well. L knee PROM improvements to 136 deg today during heel slides. Patient demonstrated fatigue post treatment, exhibited good technique with therapeutic exercises, and would benefit from continued PT to improve range of motion and strength.     Plan: Continue per plan of care.     Precautions: ACL Reconstruction performed on 10/6     Daily Treatment Log  Manuals 3/4 3/14 3/15 3/18 3/20 3/21 3/25   Tib Glides          GISTM           MFDc           PROM GRC GRC  SFP SFP GRC SFP   TKE Stretching GRC GRC SFP   GRC    Psoas Release      GRC    Neuro Re-Ed  3/4 3/14 3/15 3/18 3/20 3/21 3/25   NMES QS      10'     NMES TC      10'    BFR  SL Press #35  DL Press #90  Pball HS Curls   SL RDL #20KB         Ther Ex 3/4 3/14 3/15 3/18 3/20 3/21 3/25   Bike   10 min 10 min 10 min 10 min 10'  10'   Heel slides  30 x 10\"  10x20' 5x60\" 5x60\" 10 x 60\"  10 x 60\"    Wall Slides          Wall Slides Weighted MHP 10'  MHP 10'  5# 15x30\"  5# 10x30\" 10 min on/off c MHP L quad 10 min on/off c MHP L quad 10 min on/off c MHP L quad   Supine knee flex S c hip flexed 90    5x60\" 5x60\"     Heel Prop    Calf S c strap 10x10\" Calf S c strap 10x10\"  Calf S c strap 10x10\"   Squat with HR and Table Assist      10 x 60\"  10 x 30\"    EOT Flexion Stretch           Standing clamshell / Lateral band walk          TKE       MTB 20x5\"   Double Leg Press          B-stance goblet squat          Split squat          Retro deadmill          Couch Stretch          Supine " "Alvraoall HS Curls           Prone quad S c strap   3x60\"   5 x 60\"     Ziyad S c strap   3x30\"   5 x 60\"  5x30\"   FFE P-A ankle mobilization c band                    Ther Activity                              Gait Training                              Modalities                                "

## 2024-04-02 ENCOUNTER — OFFICE VISIT (OUTPATIENT)
Dept: PHYSICAL THERAPY | Facility: OTHER | Age: 19
End: 2024-04-02
Payer: COMMERCIAL

## 2024-04-02 DIAGNOSIS — Z98.890 S/P ARTHROSCOPIC RECONSTRUCTION OF ACL OF RIGHT KNEE USING QUADRICEPS TENDON AUTOGRAFT: Primary | ICD-10-CM

## 2024-04-02 DIAGNOSIS — Z87.39 S/P ARTHROSCOPIC RECONSTRUCTION OF ACL OF RIGHT KNEE USING QUADRICEPS TENDON AUTOGRAFT: Primary | ICD-10-CM

## 2024-04-02 PROCEDURE — 97140 MANUAL THERAPY 1/> REGIONS: CPT | Performed by: PHYSICAL THERAPIST

## 2024-04-04 ENCOUNTER — OFFICE VISIT (OUTPATIENT)
Dept: PHYSICAL THERAPY | Facility: OTHER | Age: 19
End: 2024-04-04
Payer: COMMERCIAL

## 2024-04-04 DIAGNOSIS — Z98.890 S/P ARTHROSCOPIC RECONSTRUCTION OF ACL OF RIGHT KNEE USING QUADRICEPS TENDON AUTOGRAFT: Primary | ICD-10-CM

## 2024-04-04 DIAGNOSIS — Z87.39 S/P ARTHROSCOPIC RECONSTRUCTION OF ACL OF RIGHT KNEE USING QUADRICEPS TENDON AUTOGRAFT: Primary | ICD-10-CM

## 2024-04-04 PROCEDURE — 97140 MANUAL THERAPY 1/> REGIONS: CPT | Performed by: PHYSICAL THERAPIST

## 2024-04-07 NOTE — PROGRESS NOTES
"Daily Note   Today's date: 2024  Patient name: Ifeanyi Shin Jr.  : 2005  MRN: [id number]  Referring provider: Tony Snow MD  Dx:   Encounter Diagnosis     ICD-10-CM    1. S/P arthroscopic reconstruction of ACL of left knee using quadriceps tendon autograft  Z98.890     Z87.39                          Subjective: Patient reports he has been consistent with home exercises.     Objective: See treatment diary below    Knee Flexion: 136 deg    Assessment: Tolerated treatment well. L knee PROM improvements to 136 deg today during heel slides. Patient demonstrated fatigue post treatment, exhibited good technique with therapeutic exercises, and would benefit from continued PT to improve range of motion and strength.     Plan: Continue per plan of care.     Precautions: ACL Reconstruction performed on 10/6     Daily Treatment Log  Manuals 3/4 3/14 3/15 3/18 3/20 3/21 3/25   Tib Glides          GISTM           MFDc           PROM GRC GRC  SFP SFP GRC SFP   TKE Stretching GRC GRC SFP   GRC    Psoas Release      GRC    Neuro Re-Ed  3/4 3/14 3/15 3/18 3/20 3/21 3/25   NMES QS      10'     NMES TC      10'    BFR  SL Press #35  DL Press #90  Pball HS Curls   SL RDL #20KB         Ther Ex 3/4 3/14 3/15 3/18 3/20 3/21 3/25   Bike   10 min 10 min 10 min 10 min 10'  10'   Heel slides  30 x 10\"  10x20' 5x60\" 5x60\" 10 x 60\"  10 x 60\"    Wall Slides          Wall Slides Weighted MHP 10'  MHP 10'  5# 15x30\"  5# 10x30\" 10 min on/off c MHP L quad 10 min on/off c MHP L quad 10 min on/off c MHP L quad   Supine knee flex S c hip flexed 90    5x60\" 5x60\"     Heel Prop    Calf S c strap 10x10\" Calf S c strap 10x10\"  Calf S c strap 10x10\"   Squat with HR and Table Assist      10 x 60\"  10 x 30\"    EOT Flexion Stretch           Standing clamshell / Lateral band walk          TKE       MTB 20x5\"   Double Leg Press          B-stance goblet squat          Split squat          Retro deadmill          Couch Stretch        " "  Supine Pball HS Curls           Prone quad S c strap   3x60\"   5 x 60\"     Ziyad S c strap   3x30\"   5 x 60\"  5x30\"   FFE P-A ankle mobilization c band                    Ther Activity                              Gait Training                              Modalities                                "

## 2024-04-07 NOTE — PROGRESS NOTES
"Daily Note   Today's date: 2024  Patient name: Ifeanyi Shin Jr.  : 2005  MRN: [id number]  Referring provider: Tony Snow MD  Dx:   Encounter Diagnosis     ICD-10-CM    1. S/P arthroscopic reconstruction of ACL of left knee using quadriceps tendon autograft  Z98.890     Z87.39                          Subjective: Patient reports he has been consistent with home exercises.     Objective: See treatment diary below    Knee Flexion: 136 deg    Assessment: Tolerated treatment well. L knee PROM improvements to 136 deg today during heel slides. Patient demonstrated fatigue post treatment, exhibited good technique with therapeutic exercises, and would benefit from continued PT to improve range of motion and strength.     Plan: Continue per plan of care.     Precautions: ACL Reconstruction performed on 10/6     Daily Treatment Log  Manuals 3/4 3/14 3/15 3/18 3/20 3/21 3/25   Tib Glides          GISTM           MFDc           PROM GRC GRC  SFP SFP GRC SFP   TKE Stretching GRC GRC SFP   GRC    Psoas Release      GRC    Neuro Re-Ed  3/4 3/14 3/15 3/18 3/20 3/21 3/25   NMES QS      10'     NMES TC      10'    BFR  SL Press #35  DL Press #90  Pball HS Curls   SL RDL #20KB         Ther Ex 3/4 3/14 3/15 3/18 3/20 3/21 3/25   Bike   10 min 10 min 10 min 10 min 10'  10'   Heel slides  30 x 10\"  10x20' 5x60\" 5x60\" 10 x 60\"  10 x 60\"    Wall Slides          Wall Slides Weighted MHP 10'  MHP 10'  5# 15x30\"  5# 10x30\" 10 min on/off c MHP L quad 10 min on/off c MHP L quad 10 min on/off c MHP L quad   Supine knee flex S c hip flexed 90    5x60\" 5x60\"     Heel Prop    Calf S c strap 10x10\" Calf S c strap 10x10\"  Calf S c strap 10x10\"   Squat with HR and Table Assist      10 x 60\"  10 x 30\"    EOT Flexion Stretch           Standing clamshell / Lateral band walk          TKE       MTB 20x5\"   Double Leg Press          B-stance goblet squat          Split squat          Retro deadmill          Couch Stretch        " "  Supine Pball HS Curls           Prone quad S c strap   3x60\"   5 x 60\"     Ziyad S c strap   3x30\"   5 x 60\"  5x30\"   FFE P-A ankle mobilization c band                    Ther Activity                              Gait Training                              Modalities                                "

## 2024-04-08 ENCOUNTER — OFFICE VISIT (OUTPATIENT)
Dept: PHYSICAL THERAPY | Facility: OTHER | Age: 19
End: 2024-04-08
Payer: COMMERCIAL

## 2024-04-08 DIAGNOSIS — Z87.39 S/P ARTHROSCOPIC RECONSTRUCTION OF ACL OF RIGHT KNEE USING QUADRICEPS TENDON AUTOGRAFT: Primary | ICD-10-CM

## 2024-04-08 DIAGNOSIS — Z98.890 S/P ARTHROSCOPIC RECONSTRUCTION OF ACL OF RIGHT KNEE USING QUADRICEPS TENDON AUTOGRAFT: Primary | ICD-10-CM

## 2024-04-08 PROCEDURE — 97140 MANUAL THERAPY 1/> REGIONS: CPT | Performed by: PHYSICAL THERAPIST

## 2024-04-11 ENCOUNTER — OFFICE VISIT (OUTPATIENT)
Dept: PHYSICAL THERAPY | Facility: OTHER | Age: 19
End: 2024-04-11
Payer: COMMERCIAL

## 2024-04-11 DIAGNOSIS — Z98.890 S/P ARTHROSCOPIC RECONSTRUCTION OF ACL OF RIGHT KNEE USING QUADRICEPS TENDON AUTOGRAFT: Primary | ICD-10-CM

## 2024-04-11 DIAGNOSIS — Z87.39 S/P ARTHROSCOPIC RECONSTRUCTION OF ACL OF RIGHT KNEE USING QUADRICEPS TENDON AUTOGRAFT: Primary | ICD-10-CM

## 2024-04-11 PROCEDURE — 97140 MANUAL THERAPY 1/> REGIONS: CPT | Performed by: PHYSICAL THERAPIST

## 2024-04-15 ENCOUNTER — OFFICE VISIT (OUTPATIENT)
Dept: PHYSICAL THERAPY | Facility: OTHER | Age: 19
End: 2024-04-15
Payer: COMMERCIAL

## 2024-04-15 DIAGNOSIS — Z98.890 S/P ARTHROSCOPIC RECONSTRUCTION OF ACL OF RIGHT KNEE USING QUADRICEPS TENDON AUTOGRAFT: Primary | ICD-10-CM

## 2024-04-15 DIAGNOSIS — Z87.39 S/P ARTHROSCOPIC RECONSTRUCTION OF ACL OF RIGHT KNEE USING QUADRICEPS TENDON AUTOGRAFT: Primary | ICD-10-CM

## 2024-04-15 PROCEDURE — 97140 MANUAL THERAPY 1/> REGIONS: CPT | Performed by: PHYSICAL THERAPIST

## 2024-04-15 NOTE — PROGRESS NOTES
"Daily Note   Today's date: 2024  Patient name: Ifeanyi Shin Jr.  : 2005  MRN: [id number]  Referring provider: Tony Snow MD  Dx:   Encounter Diagnosis     ICD-10-CM    1. S/P arthroscopic reconstruction of ACL of left knee using quadriceps tendon autograft  Z98.890     Z87.39                          Subjective: Patient reports he has been consistent with home exercises.     Objective: See treatment diary below    Knee Flexion: 136 deg    Assessment: Tolerated treatment well. L knee PROM improvements to 136 deg today during heel slides. Patient demonstrated fatigue post treatment, exhibited good technique with therapeutic exercises, and would benefit from continued PT to improve range of motion and strength.     Plan: Continue per plan of care.     Precautions: ACL Reconstruction performed on 10/6     Daily Treatment Log  Manuals 3/4 3/14 3/15 3/18 3/20 3/21 3/25   Tib Glides          GISTM           MFDc           PROM GRC GRC  SFP SFP GRC SFP   TKE Stretching GRC GRC SFP   GRC    Psoas Release      GRC    Neuro Re-Ed  3/4 3/14 3/15 3/18 3/20 3/21 3/25   NMES QS      10'     NMES TC      10'    BFR  SL Press #35  DL Press #90  Pball HS Curls   SL RDL #20KB         Ther Ex 3/4 3/14 3/15 3/18 3/20 3/21 3/25   Bike   10 min 10 min 10 min 10 min 10'  10'   Heel slides  30 x 10\"  10x20' 5x60\" 5x60\" 10 x 60\"  10 x 60\"    Wall Slides          Wall Slides Weighted MHP 10'  MHP 10'  5# 15x30\"  5# 10x30\" 10 min on/off c MHP L quad 10 min on/off c MHP L quad 10 min on/off c MHP L quad   Supine knee flex S c hip flexed 90    5x60\" 5x60\"     Heel Prop    Calf S c strap 10x10\" Calf S c strap 10x10\"  Calf S c strap 10x10\"   Squat with HR and Table Assist      10 x 60\"  10 x 30\"    EOT Flexion Stretch           Standing clamshell / Lateral band walk          TKE       MTB 20x5\"   Double Leg Press          B-stance goblet squat          Split squat          Retro deadmill          Couch Stretch        " "  Supine Pball HS Curls           Prone quad S c strap   3x60\"   5 x 60\"     Ziyad S c strap   3x30\"   5 x 60\"  5x30\"   FFE P-A ankle mobilization c band                    Ther Activity                              Gait Training                              Modalities                                "

## 2024-04-15 NOTE — PROGRESS NOTES
"Daily Note   Today's date: 2024  Patient name: Ifeanyi Shin Jr.  : 2005  MRN: [id number]  Referring provider: Tony Snow MD  Dx:   Encounter Diagnosis     ICD-10-CM    1. S/P arthroscopic reconstruction of ACL of left knee using quadriceps tendon autograft  Z98.890     Z87.39                          Subjective: Patient reports he has been consistent with home exercises.     Objective: See treatment diary below    Knee Flexion: 136 deg    Assessment: Tolerated treatment well. L knee PROM improvements to 136 deg today during heel slides. Patient demonstrated fatigue post treatment, exhibited good technique with therapeutic exercises, and would benefit from continued PT to improve range of motion and strength.     Plan: Continue per plan of care.     Precautions: ACL Reconstruction performed on 10/6     Daily Treatment Log  Manuals 3/4 3/14 3/15 3/18 3/20 3/21 3/25   Tib Glides          GISTM           MFDc           PROM GRC GRC  SFP SFP GRC SFP   TKE Stretching GRC GRC SFP   GRC    Psoas Release      GRC    Neuro Re-Ed  3/4 3/14 3/15 3/18 3/20 3/21 3/25   NMES QS      10'     NMES TC      10'    BFR  SL Press #35  DL Press #90  Pball HS Curls   SL RDL #20KB         Ther Ex 3/4 3/14 3/15 3/18 3/20 3/21 3/25   Bike   10 min 10 min 10 min 10 min 10'  10'   Heel slides  30 x 10\"  10x20' 5x60\" 5x60\" 10 x 60\"  10 x 60\"    Wall Slides          Wall Slides Weighted MHP 10'  MHP 10'  5# 15x30\"  5# 10x30\" 10 min on/off c MHP L quad 10 min on/off c MHP L quad 10 min on/off c MHP L quad   Supine knee flex S c hip flexed 90    5x60\" 5x60\"     Heel Prop    Calf S c strap 10x10\" Calf S c strap 10x10\"  Calf S c strap 10x10\"   Squat with HR and Table Assist      10 x 60\"  10 x 30\"    EOT Flexion Stretch           Standing clamshell / Lateral band walk          TKE       MTB 20x5\"   Double Leg Press          B-stance goblet squat          Split squat          Retro deadmill          Couch Stretch        " "  Supine Pball HS Curls           Prone quad S c strap   3x60\"   5 x 60\"     Ziyad S c strap   3x30\"   5 x 60\"  5x30\"   FFE P-A ankle mobilization c band                    Ther Activity                              Gait Training                              Modalities                                "

## 2024-04-18 ENCOUNTER — OFFICE VISIT (OUTPATIENT)
Dept: PHYSICAL THERAPY | Facility: OTHER | Age: 19
End: 2024-04-18
Payer: COMMERCIAL

## 2024-04-18 DIAGNOSIS — Z98.890 S/P ARTHROSCOPIC RECONSTRUCTION OF ACL OF RIGHT KNEE USING QUADRICEPS TENDON AUTOGRAFT: Primary | ICD-10-CM

## 2024-04-18 DIAGNOSIS — Z87.39 S/P ARTHROSCOPIC RECONSTRUCTION OF ACL OF RIGHT KNEE USING QUADRICEPS TENDON AUTOGRAFT: Primary | ICD-10-CM

## 2024-04-18 PROCEDURE — 97110 THERAPEUTIC EXERCISES: CPT

## 2024-04-18 PROCEDURE — 97140 MANUAL THERAPY 1/> REGIONS: CPT

## 2024-04-18 PROCEDURE — 97112 NEUROMUSCULAR REEDUCATION: CPT

## 2024-04-18 NOTE — PROGRESS NOTES
"Daily Note   Today's date: 4/15/2024  Patient name: Ifeanyi Shin Jr.  : 2005  MRN: [id number]  Referring provider: Tony Snow MD  Dx:   Encounter Diagnosis     ICD-10-CM    1. S/P arthroscopic reconstruction of ACL of left knee using quadriceps tendon autograft  Z98.890     Z87.39         1 on 1 with PTA and PT             Subjective: Patient reports feeling pretty good overall. Some HS discomfort reported with full available knee flexion, but no significant pain.     Objective: See treatment diary below    Knee Flexion: 138 deg    Assessment: Tolerated treatment well. Progressed patient to hex bar strengthening with superset of isometric quad plus HS strength. Good ability to maintain contraction. Fatigued and challenged with SL activities Patient demonstrated fatigue post treatment, exhibited good technique with therapeutic exercises, and would benefit from continued PT to improve range of motion and strength.     Plan: Continue per plan of care.  Cont emphasis on ROM, begin to progress strengthening.    Precautions: ACL Reconstruction performed on 10/6     Daily Treatment Log  Manuals 3/25 3/28 4/2 4/4 4/8 4/11 4/15 4/18   Tib Glides           GISTM     [facility] GR  MP   MFDc     [facility]  MP   PROM SFP [facility] GRC  MP   TKE Stretching           Psoas Release    [facility]     Neuro Re-Ed  3/25 3/28 4/2 4/4 4/8 4/11 4/15 4/18   NMES QS    10' 10'       NMES TC    10'  10'       BFR            Ther Ex 3/25 3/28 4/2 4/4 4/8 4/11 4/15 4/18   Bike  10' 10'  10'  10'  10'  10'  10'  TM 10'   Heel slides 10 x 60\"  10 x 60\"  10 x 60\"  10 x 60\"  10 x 60\"  10 x 60\"  10 x 60\"     Wall Slides           Wall Slides Weighted 10 min on/off c MHP L quad 10 min on/off c MHP L quad 10 min on/off c MHP L quad 10 min on/off c MHP L quad 10 min on/off c MHP L quad 10 min on/off c MHP L quad 10 min on/off c MHP L quad 10 min on/off c MHP L Quad   Heel Prop Calf S c strap 10x10\" Calf S c strap 10x10\" Calf S " "c strap 10x10\" Calf S c strap 10x10\" Calf S c strap 10x10\" Calf S c strap 10x10\" Calf S c strap 10x10\"    Squat with HR and Table Assist 10 x 30\"  10 x 30\"  10 x 30\"  10 x 30\"  10 x 30\"  10 x 30\"  10 x 30\"     EOT Flexion Stretch                       Standing clamshell / Lateral band walk      Red TB   30 x 5\" ea Red TB   30 x 5\" ea MTB 30x 5\" ea   TKE MTB 20x5\"     MTB 30x5\" MTB 30x5\"    LAQ      MTB 30x  MTB 30x  MTB 30x   Bird Dips       Surge 3 x 10 Surge 3 x 10 Surge 3 x 10   HR Squats Banded      30x   Red TB/#25KB 30x   Red TB/#25KB 30x Red TB #25kb   Slider HS Curls       MC 30x   Eccentric Lower  30x MC 30x   Eccentric Lower  30x MC 30x Eccentric Lower 30x    Hexbar DL         4/2/1 tempo 1 x 10 bar  2 x 10 +20lbs   Temper Tantrums        3 x 20s                                                Ther Activity                                 Gait Training                                 Modalities                                     "

## 2024-04-18 NOTE — PROGRESS NOTES
"Daily Note   Today's date: 4/15/2024  Patient name: Ifeanyi Shin Jr.  : 2005  MRN: [id number]  Referring provider: Tony Snow MD  Dx:   Encounter Diagnosis     ICD-10-CM    1. S/P arthroscopic reconstruction of ACL of left knee using quadriceps tendon autograft  Z98.890     Z87.39         IEP [id number]  1 on 1 5606-4221  Start Time: 1215  Stop Time: 1315  Total time in clinic (min): 60 minutes    Subjective: Patient reports no new issues in the hamstring.  He said he is feeling pretty good.  He reports fatigue at the end of today's session.      Objective: See treatment diary below    Knee Flexion: 138 deg    Assessment: Tolerated treatment well. PT is starting to focus on strengthening because he has great ROM.  ROM is still an emphasis.  Patient demonstrated fatigue post treatment, exhibited good technique with therapeutic exercises, and would benefit from continued PT to improve range of motion and strength.     Plan: Continue per plan of care.     Precautions: ACL Reconstruction performed on 10/6     Daily Treatment Log  Manuals 3/25 3/28 4/2 4/4 4/8 4/11 4/15   Tib Glides          GISTM     [facility]    MFDc     [facility]    PROM SFP [facility]    TKE Stretching          Psoas Release    [facility]    Neuro Re-Ed  3/25 3/28 4/2 4/4 4/8 4/11 4/15   NMES QS    10' 10'      NMES TC    10'  10'      BFR           Ther Ex 3/25 3/28 4/2 4/4 4/8 4/11 4/15   Bike  10' 10'  10'  10'  10'  10'  10'    Heel slides 10 x 60\"  10 x 60\"  10 x 60\"  10 x 60\"  10 x 60\"  10 x 60\"  10 x 60\"    Wall Slides          Wall Slides Weighted 10 min on/off c MHP L quad 10 min on/off c MHP L quad 10 min on/off c MHP L quad 10 min on/off c MHP L quad 10 min on/off c MHP L quad 10 min on/off c MHP L quad 10 min on/off c MHP L quad   Heel Prop Calf S c strap 10x10\" Calf S c strap 10x10\" Calf S c strap 10x10\" Calf S c strap 10x10\" Calf S c strap 10x10\" Calf S c strap 10x10\" Calf S c strap 10x10\"   Squat with HR and " "Table Assist 10 x 30\"  10 x 30\"  10 x 30\"  10 x 30\"  10 x 30\"  10 x 30\"  10 x 30\"    EOT Flexion Stretch           Standing clamshell / Lateral band walk      Red TB   30 x 5\" ea Red TB   30 x 5\" ea   TKE MTB 20x5\"     MTB 30x5\" MTB 30x5\"   LAQ      MTB 30x  MTB 30x    Bird Dips       Surge 3 x 10 Surge 3 x 10   HR Squats Banded      30x   Red TB/#25KB 30x   Red TB/#25KB   Slider HS Curls       MC 30x   Eccentric Lower  30x MC 30x   Eccentric Lower  30x                                                               Ther Activity                              Gait Training                              Modalities                                "

## 2024-04-25 ENCOUNTER — OFFICE VISIT (OUTPATIENT)
Dept: PHYSICAL THERAPY | Facility: OTHER | Age: 19
End: 2024-04-25
Payer: COMMERCIAL

## 2024-04-25 DIAGNOSIS — Z87.39 S/P ARTHROSCOPIC RECONSTRUCTION OF ACL OF RIGHT KNEE USING QUADRICEPS TENDON AUTOGRAFT: Primary | ICD-10-CM

## 2024-04-25 DIAGNOSIS — Z98.890 S/P ARTHROSCOPIC RECONSTRUCTION OF ACL OF RIGHT KNEE USING QUADRICEPS TENDON AUTOGRAFT: Primary | ICD-10-CM

## 2024-04-25 PROCEDURE — 97140 MANUAL THERAPY 1/> REGIONS: CPT | Performed by: PHYSICAL THERAPIST

## 2024-04-29 NOTE — PROGRESS NOTES
"Daily Note   Today's date: 2024  Patient name: Ifenayi Shin Jr.  : 2005  MRN: [id number]  Referring provider: Tony Snow MD  Dx:   Encounter Diagnosis     ICD-10-CM    1. S/P arthroscopic reconstruction of ACL of left knee using quadriceps tendon autograft  Z98.890     Z87.39         IEP [id number]  1 on 1 0625-2414  IEP [id number]  Start Time: 1115  Stop Time: 1230  Total time in clinic (min): 75 minutes    Subjective: Patient reports no new issues at this time.      Objective: See treatment diary below    Knee Flexion: 138 deg    Assessment: Tolerated treatment well.  PT added several new exercises.  Continue to progress patient per tolerance.  Patient demonstrated fatigue post treatment, exhibited good technique with therapeutic exercises, and would benefit from continued PT to improve range of motion and strength.     Plan: Continue per plan of care.  Continue to emphasize ROM and begin to progress strengthening.    Precautions: ACL Reconstruction performed on 10/6     Daily Treatment Log  Manuals 4/4 4/8 4/11 4/15 4/18 4/25    Tib Glides          GISTM  GRC GR GRC  MP     MFDc  GRC GR GRC  MP     PROM GRC GR GRC  MP     TKE Stretching          Psoas Release [facility]       Neuro Re-Ed  4/4 4/8 4/11 4/15 4/18 4/25    NMES QS 10' 10'         NMES TC 10'  10'         BFR           Ther Ex 4/4 4/8 4/11 4/15 4/18 4/25    Bike  10'  10'  10'  10'  TM 10'     Heel slides 10 x 60\"  10 x 60\"  10 x 60\"  10 x 60\"       Wall Slides          Wall Slides Weighted 10 min on/off c MHP L quad 10 min on/off c MHP L quad 10 min on/off c MHP L quad 10 min on/off c MHP L quad 10 min on/off c MHP L Quad     Heel Prop Calf S c strap 10x10\" Calf S c strap 10x10\" Calf S c strap 10x10\" Calf S c strap 10x10\"      Squat with HR and Table Assist 10 x 30\"  10 x 30\"  10 x 30\"  10 x 30\"       EOT Flexion Stretch                     Standing clamshell / Lateral band walk   Red TB   30 x 5\" ea Red TB   30 x 5\" ea MTB 30x " "5\" ea RTB/Soccer   30 x 5\"   RTB2x   Resisted SS  Monster Walks   5x ea     TKE   MTB 30x5\" MTB 30x5\"      LAQ   MTB 30x  MTB 30x  MTB 30x [place] #20  3 x 10     Bird Dips    Surge 3 x 10 Surge 3 x 10 Surge 3 x 10 Surge 3 x 10    HR Squats Banded   30x   Red TB/#25KB 30x   Red TB/#25KB 30x Red TB #25kb 30x Red TB  #35KB    Slider HS Curls    MC 30x   Eccentric Lower  30x MC 30x   Eccentric Lower  30x MC 30x Eccentric Lower 30x  MC 30x   Ecc Lower 30x     Hexbar DL      4/2/1 tempo 1 x 10 bar  2 x 10 +20lbs     Temper Tantrums     3 x 20s      Add Sliders       #15KB Pink TB   2 x 10 ea    HS Curls       30 x 5\" ea    Leg Press                    Ther Activity                              Gait Training                              Modalities                                "

## 2024-04-30 ENCOUNTER — OFFICE VISIT (OUTPATIENT)
Dept: PHYSICAL THERAPY | Facility: OTHER | Age: 19
End: 2024-04-30
Payer: COMMERCIAL

## 2024-04-30 DIAGNOSIS — Z87.39 S/P ARTHROSCOPIC RECONSTRUCTION OF ACL OF RIGHT KNEE USING QUADRICEPS TENDON AUTOGRAFT: Primary | ICD-10-CM

## 2024-04-30 DIAGNOSIS — Z98.890 S/P ARTHROSCOPIC RECONSTRUCTION OF ACL OF RIGHT KNEE USING QUADRICEPS TENDON AUTOGRAFT: Primary | ICD-10-CM

## 2024-04-30 PROCEDURE — 97140 MANUAL THERAPY 1/> REGIONS: CPT | Performed by: PHYSICAL THERAPIST

## 2024-05-02 ENCOUNTER — OFFICE VISIT (OUTPATIENT)
Dept: PHYSICAL THERAPY | Facility: OTHER | Age: 19
End: 2024-05-02
Payer: COMMERCIAL

## 2024-05-02 DIAGNOSIS — Z87.39 S/P ARTHROSCOPIC RECONSTRUCTION OF ACL OF RIGHT KNEE USING QUADRICEPS TENDON AUTOGRAFT: Primary | ICD-10-CM

## 2024-05-02 DIAGNOSIS — Z98.890 S/P ARTHROSCOPIC RECONSTRUCTION OF ACL OF RIGHT KNEE USING QUADRICEPS TENDON AUTOGRAFT: Primary | ICD-10-CM

## 2024-05-02 PROCEDURE — 97140 MANUAL THERAPY 1/> REGIONS: CPT

## 2024-05-02 NOTE — PROGRESS NOTES
"Daily Note   Today's date: 24  Patient name: Ifeanyi Shin Jr.  : 2005  MRN: [id number]  Referring provider: Tony Snow MD  Dx:   Encounter Diagnosis     ICD-10-CM    1. S/P arthroscopic reconstruction of ACL of left knee using quadriceps tendon autograft  Z98.890     Z87.39                      Subjective: Patient questioned if he would always have a \"weird walk\". He states his parents notice he swings his leg and he notices he at times leans more heavily on the R LE. He also questioned if his non painful crepitus will be persistent.     Objective: See treatment diary below    Knee Flexion: 138 deg    Assessment: Tolerated treatment well.  Progressed as charted with good tolerance. Added [place] curls to work on walking form. Patient demonstrated fatigue post treatment, exhibited good technique with therapeutic exercises, and would benefit from continued PT to improve range of motion and strength.     Plan: Continue per plan of care.  Continue to emphasize ROM and begin to progress strengthening.    Precautions: ACL Reconstruction performed on 10/6     Daily Treatment Log  Manuals 4/11 4/15 4/18 4/25 4/29 5/   Tib Glides         GISTM  GRC  MP      MFDc  GRC  MP      PROM GRC  MP      TKE Stretching     GRC    Psoas Release GRC    GRC    Neuro Re-Ed  4/11 4/15 4/18 4/25 4/29 4/29   NMES QS         NMES TC         BFR          Ther Ex 4/11 4/15 4/18 4/25 4/29 5/2   Bike  10'  10'  TM 10'  TM 10'  10'   Heel slides 10 x 60\"  10 x 60\"        Wall Slides         Wall Slides Weighted 10 min on/off c MHP L quad 10 min on/off c MHP L quad 10 min on/off c MHP L Quad      Heel Prop Calf S c strap 10x10\" Calf S c strap 10x10\"       Squat with HR and Table Assist 10 x 30\"  10 x 30\"        EOT Flexion Stretch                   Standing clamshell / Lateral band walk Red TB   30 x 5\" ea Red TB   30 x 5\" ea MTB 30x 5\" ea RTB/Soccer   30 x 5\"   RTB2x   Resisted SS  Monster Walks   5x ea  RTB/Soccer   30 x " "5\"   RTB2x   Resisted SS  Monster Walks   5x ea  RTB2x Resisted SS Monster Walks 5x ea   TKE MTB 30x5\" MTB 30x5\"   MTB 30x5\"     LAQ MTB 30x  MTB 30x  MTB 30x [place] #20  3 x 10  [place] #20  3 x 10  [place] #20 1 x 10  26# 1 x 10   Bird Dips  Surge 3 x 10 Surge 3 x 10 Surge 3 x 10 Surge 3 x 10 Surge 3 x 10 Surge 3 x 10   HR Squats Banded 30x   Red TB/#25KB 30x   Red TB/#25KB 30x Red TB #25kb 30x Red TB  #35KB 30x Red TB  #35KB 30x Red TB 35#KB   Slider HS Curls  MC 30x   Eccentric Lower  30x MC 30x   Eccentric Lower  30x MC 30x Eccentric Lower 30x  MC 30x   Ecc Lower 30x  30x  30x   Hexbar DL    4/2/1 tempo 1 x 10 bar  2 x 10 +20lbs      Temper Tantrums   3 x 20s       Add Sliders     #15KB Pink TB   2 x 10 ea #15KB Pink TB   2 x 10 ea    HS Curls     30 x 5\" ea 30 x 5\" ea    Leg Press     #100 3 x 10 DL  #50 3 x 10 ea SL  #110 DL to SL  3 x 10 ea #100 1 x 12 DL  2x15  #50 3 x 12      [place] curls      30x   Ther Activity                           Gait Training                           Modalities                               "

## 2024-05-06 ENCOUNTER — OFFICE VISIT (OUTPATIENT)
Dept: PHYSICAL THERAPY | Facility: OTHER | Age: 19
End: 2024-05-06
Payer: COMMERCIAL

## 2024-05-06 DIAGNOSIS — Z98.890 S/P ARTHROSCOPIC RECONSTRUCTION OF ACL OF RIGHT KNEE USING QUADRICEPS TENDON AUTOGRAFT: Primary | ICD-10-CM

## 2024-05-06 DIAGNOSIS — Z87.39 S/P ARTHROSCOPIC RECONSTRUCTION OF ACL OF RIGHT KNEE USING QUADRICEPS TENDON AUTOGRAFT: Primary | ICD-10-CM

## 2024-05-06 PROCEDURE — 97110 THERAPEUTIC EXERCISES: CPT | Performed by: PHYSICAL THERAPIST

## 2024-05-06 NOTE — PROGRESS NOTES
"Daily Note   Today's date: 2024  Patient name: Ifeanyi Shin Jr.  : 2005  MRN: [id number]  Referring provider: Tony Snow MD  Dx:   Encounter Diagnosis     ICD-10-CM    1. S/P arthroscopic reconstruction of ACL of left knee using quadriceps tendon autograft  Z98.890     Z87.39         IEP 9716-2721  1 on 1 1376-0315  IEP 1554-0770             Subjective: Patient reports no new issues at this time.      Objective: See treatment diary below    Knee Flexion: 138 deg    Assessment: Tolerated treatment well.  PT added several new exercises.  Continue to progress patient per tolerance.  Patient demonstrated fatigue post treatment, exhibited good technique with therapeutic exercises, and would benefit from continued PT to improve range of motion and strength.     Plan: Continue per plan of care.  Continue to emphasize ROM and begin to progress strengthening.    Precautions: ACL Reconstruction performed on 10/6     Daily Treatment Log  Manuals 4/4 4/8 4/11 4/15 4/18 4/25    Tib Glides          GISTM  GR GR GR  MP     MFDc  GRC [facility] GRC  MP     PROM GRC [facility] GRC  MP     TKE Stretching          Psoas Release [facility]       Neuro Re-Ed  4/4 4/8 4/11 4/15 4/18 4/25    NMES QS 10' 10'         NMES TC 10'  10'         BFR           Ther Ex 4/4 4/8 4/11 4/15 4/18 4/25    Bike  10'  10'  10'  10'  TM 10'     Heel slides 10 x 60\"  10 x 60\"  10 x 60\"  10 x 60\"       Wall Slides          Wall Slides Weighted 10 min on/off c MHP L quad 10 min on/off c MHP L quad 10 min on/off c MHP L quad 10 min on/off c MHP L quad 10 min on/off c MHP L Quad     Heel Prop Calf S c strap 10x10\" Calf S c strap 10x10\" Calf S c strap 10x10\" Calf S c strap 10x10\"      Squat with HR and Table Assist 10 x 30\"  10 x 30\"  10 x 30\"  10 x 30\"       EOT Flexion Stretch                     Standing clamshell / Lateral band walk   Red TB   30 x 5\" ea Red TB   30 x 5\" ea MTB 30x 5\" [place]ad[place] RTB/Soccer   30 x 5\"   RTB2x   Resisted SS  Monster Walks " "  5x ea     TKE   MTB 30x5\" MTB 30x5\"      LAQ   MTB 30x  MTB 30x  MTB 30x [place] #20  3 x 10     Bird Dips    Surge 3 x 10 Surge 3 x 10 Surge 3 x 10 Surge 3 x 10    HR Squats Banded   30x   Red TB/#25KB 30x   Red TB/#25KB 30x Red TB #25kb 30x Red TB  #35KB    Slider HS Curls    MC 30x   Eccentric Lower  30x MC 30x   Eccentric Lower  30x MC 30x Eccentric Lower 30x  MC 30x   Ecc Lower 30x     Hexbar DL      4/2/1 tempo 1 x 10 bar  2 x 10 +20lbs     Temper Tantrums     3 x 20s      Add Sliders       #15KB Pink TB   2 x 10 ea    HS Curls       30 x 5\" ea    Leg Press                    Ther Activity                              Gait Training                              Modalities                                "

## 2024-05-09 ENCOUNTER — OFFICE VISIT (OUTPATIENT)
Dept: PHYSICAL THERAPY | Facility: OTHER | Age: 19
End: 2024-05-09
Payer: COMMERCIAL

## 2024-05-09 DIAGNOSIS — Z87.39 S/P ARTHROSCOPIC RECONSTRUCTION OF ACL OF RIGHT KNEE USING QUADRICEPS TENDON AUTOGRAFT: Primary | ICD-10-CM

## 2024-05-09 DIAGNOSIS — Z98.890 S/P ARTHROSCOPIC RECONSTRUCTION OF ACL OF RIGHT KNEE USING QUADRICEPS TENDON AUTOGRAFT: Primary | ICD-10-CM

## 2024-05-09 PROCEDURE — 97110 THERAPEUTIC EXERCISES: CPT | Performed by: PHYSICAL THERAPIST

## 2024-05-09 PROCEDURE — 97140 MANUAL THERAPY 1/> REGIONS: CPT | Performed by: PHYSICAL THERAPIST

## 2024-05-10 ENCOUNTER — OFFICE VISIT (OUTPATIENT)
Dept: OBGYN CLINIC | Facility: CLINIC | Age: 19
End: 2024-05-10

## 2024-05-10 VITALS
SYSTOLIC BLOOD PRESSURE: 113 MMHG | DIASTOLIC BLOOD PRESSURE: 77 MMHG | WEIGHT: 213 LBS | HEIGHT: 70 IN | HEART RATE: 68 BPM | BODY MASS INDEX: 30.49 KG/M2

## 2024-05-10 DIAGNOSIS — Z98.890 STATUS POST ANTERIOR CRUCIATE LIGAMENT SURGERY: Primary | ICD-10-CM

## 2024-05-10 PROCEDURE — 99024 POSTOP FOLLOW-UP VISIT: CPT | Performed by: ORTHOPAEDIC SURGERY

## 2024-05-10 NOTE — PROGRESS NOTES
Subjective   CHIEF COMPLAINT/REASON FOR VISIT  Ifeanyi Shin Jr. is a 19 y.o. male who presents for  2 month post op follow-up after ARTHROSCOPY KNEE for lysis of adhesive lesions and debridement,manipulation under anesthesia, - Left and Manipulation Joint Knee - Left on 3/13/2024     HISTORY OF PRESENT ILLNESS  Patient that he is doing well from when he was last seen.  He has been working with physical therapy which is to help with his range of motion.  He feels he is able to walk without a limp.  He denies any pain or instability.    Objective   PHYSICAL EXAMINATION  Musculoskeletal: left Knee Examination:  General: The patient is alert, oriented, and pleasant to interact with.  Patient ambulates with normal gait pattern  Assistive Device: No  Alignment: normal  Skin is warm and dry to touch with no signs of erythema, ecchymosis, or infection   Effusion: none  ROM: 0° - 130°   MMT: deferred  TTP: None  Flexor and extensor mechanisms are intact   Knee is stable to varus and valgus stress  Du's Test Negative    Lachman's Test - 1A  2+ DP and PT pulses with brisk capillary refill to the toes  Sural, saphenous, tibial, superficial, and deep peroneal motor and sensory distributions intact  Sensation light touch intact distally    Assessment/Plan   1. Status Post ARTHROSCOPY KNEE for lysis of adhesive lesions and debridement,manipulation under anesthesia, - Left and Manipulation Joint Knee - Left    He is doing well after surgery and making appropriate progress. Advised patient and mom that patient is on track to return to sport in August but it is difficult to predict. Encouraged building up the muscles surrounding the knee joint.     We will plan to see him back at the  4 month post-operative yuval. If any issues, questions, or concerns arise between now and the next appointment, we have encouraged the patient contact our team.    Scribe Attestation      I,:  Bong Han PA-C am acting as a scribe  while in the presence of the attending physician.:       I,:  Deo Alcantar MD personally performed the services described in this documentation    as scribed in my presence.:

## 2024-05-11 NOTE — PROGRESS NOTES
"Daily Note   Today's date: 24  Patient name: Ifeanyi Shin Jr.  : 2005  MRN: [id number]  Referring provider: Tony Snow MD  Dx:   Encounter Diagnosis     ICD-10-CM    1. S/P arthroscopic reconstruction of ACL of left knee using quadriceps tendon autograft  Z98.890     Z87.39         IEP 9372-2300  1 on  4450-0676             Subjective: Patient reports no new issues.  He said he will be seeing MD on Friday.      Objective: See treatment diary below  Knee Flexion: 138 deg    Assessment: Tolerated treatment well.  Patient required some verbal and tactile cues in order to correctly complete some exercises.  Patient demonstrated fatigue post treatment, exhibited good technique with therapeutic exercises, and would benefit from continued PT to improve range of motion and strength.     Plan: Continue per plan of care.  Continue to emphasize ROM and begin to progress strengthening.    Precautions: ACL Reconstruction performed on 10/6     Daily Treatment Log  Manuals  5/6   Tib Glides        GISTM  MP       MFDc  MP       PROM MP       TKE Stretching   GRC     Psoas Release   GRC     Neuro Re-Ed  / 5/6   NMES QS        NMES TC        BFR         Ther Ex  5/2 5/6   Bike  TM 10'  TM 10'  10' Curve 10'    Heel slides        Wall Slides        Wall Slides Weighted 10 min on/off c MHP L Quad       Heel Prop        Squat with HR and Table Assist        EOT Flexion Stretch                 Standing clamshell / Lateral band walk MTB 30x 5\" ea RTB/Soccer   30 x 5\"   RTB2x   Resisted SS  Monster Walks   5x ea  RTB/Soccer   30 x 5\"   RTB2x   Resisted SS  Monster Walks   5x ea  RTB2x Resisted SS Monster Walks 5x ea RTB/Soccer   30x5\"   RTB2x   Resisted SS 5x  Monster Walks 5x   TKE   MTB 30x5\"      LAQ MTB 30x [place] #20  3 x 10  [place] #20  3 x 10  [place] #20 1 x 10  26# 1 x 10 [place] #25 4x10   Bird Dips  Surge 3 x 10 Surge 3 x 10 Surge 3 x 10 Surge 3 x 10 Surge 4 " "x 10   HR Squats Banded 30x Red TB #25kb 30x Red TB  #35KB 30x Red TB  #35KB 30x Red TB 35#KB 30x Red TB 35#KB   Slider HS Curls  MC 30x Eccentric Lower 30x  MC 30x   Ecc Lower 30x  30x  30x Butt Up   3 x 10    Hexbar DL  4/2/1 tempo 1 x 10 bar  2 x 10 +20lbs       Temper Tantrums 3 x 20s        Add Sliders   #15KB Pink TB   2 x 10 ea #15KB Pink TB   2 x 10 ea  #15KB Pink TB   2 x 10 ea   HS Curls   30 x 5\" ea 30 x 5\" ea  [place] #7.5 Prone   3 x 10 ea   Leg Press   #100 3 x 10 DL  #50 3 x 10 ea SL  #110 DL to SL  3 x 10 ea #100 1 x 12 DL  2x15  #50 3 x 12    #110 DL 3 x 10  #50 SL 3 x 10  #115 DL to SL   3 x 10 ea   [place] curls    30x    Ther Activity                        Gait Training                        Modalities                            "

## 2024-05-11 NOTE — PROGRESS NOTES
"Daily Note   Today's date: 24  Patient name: Ifeanyi Shin Jr.  : 2005  MRN: [id number]  Referring provider: Tony Snow MD  Dx:   Encounter Diagnosis     ICD-10-CM    1. S/P arthroscopic reconstruction of ACL of left knee using quadriceps tendon autograft  Z98.890     Z87.39         IEP 1220-6900  1 on 1 9921-9515  Start Time: 1115  Stop Time: 1215  Total time in clinic (min): 60 minutes    Subjective: Patient reports no new issues.  He said he will be seeing MD on Friday.      Objective: See treatment diary below  Knee Flexion: 138 deg    Assessment: Tolerated treatment well.  Patient required some verbal and tactile cues in order to correctly complete some exercises.  Patient demonstrated fatigue post treatment, exhibited good technique with therapeutic exercises, and would benefit from continued PT to improve range of motion and strength.     Plan: Continue per plan of care.  Continue to emphasize ROM and begin to progress strengthening.    Precautions: ACL Reconstruction performed on 10/6     Daily Treatment Log  Manuals    Tib Glides        GISTM  MP       MFDc  MP       PROM MP       TKE Stretching   GRC     Psoas Release   GRC     Neuro Re-Ed   5/6   NMES QS        NMES TC        BFR         Ther Ex  5/6   Bike  TM 10'  TM 10'  10' Curve 10'    Heel slides        Wall Slides        Wall Slides Weighted 10 min on/off c MHP L Quad       Heel Prop        Squat with HR and Table Assist        EOT Flexion Stretch                 Standing clamshell / Lateral band walk MTB 30x 5\" ea RTB/Soccer   30 x 5\"   RTB2x   Resisted SS  Monster Walks   5x ea  RTB/Soccer   30 x 5\"   RTB2x   Resisted SS  Monster Walks   5x ea  RTB2x Resisted SS Monster Walks 5x ea RTB/Soccer   30x5\"   RTB2x   Resisted SS 5x  Monster Walks 5x   TKE   MTB 30x5\"      LAQ MTB 30x [place] #20  3 x 10  [place] #20  3 x 10  [place] #20 1 x 10  26# 1 x 10 [place] #25 4x10   Bird " "Dips  Surge 3 x 10 Surge 3 x 10 Surge 3 x 10 Surge 3 x 10 Surge 4 x 10   HR Squats Banded 30x Red TB #25kb 30x Red TB  #35KB 30x Red TB  #35KB 30x Red TB 35#KB 30x Red TB 35#KB   Slider HS Curls  MC 30x Eccentric Lower 30x  MC 30x   Ecc Lower 30x  30x  30x Butt Up   3 x 10    Hexbar DL  4/2/1 tempo 1 x 10 bar  2 x 10 +20lbs       Temper Tantrums 3 x 20s        Add Sliders   #15KB Pink TB   2 x 10 ea #15KB Pink TB   2 x 10 ea  #15KB Pink TB   2 x 10 ea   HS Curls   30 x 5\" ea 30 x 5\" ea  [place] #7.5 Prone   3 x 10 ea   Leg Press   #100 3 x 10 DL  #50 3 x 10 ea SL  #110 DL to SL  3 x 10 ea #100 1 x 12 DL  2x15  #50 3 x 12    #110 DL 3 x 10  #50 SL 3 x 10  #115 DL to SL   3 x 10 ea   [place] curls    30x    Ther Activity                        Gait Training                        Modalities                            "

## 2024-05-13 ENCOUNTER — OFFICE VISIT (OUTPATIENT)
Dept: PHYSICAL THERAPY | Facility: OTHER | Age: 19
End: 2024-05-13
Payer: COMMERCIAL

## 2024-05-13 DIAGNOSIS — Z87.39 S/P ARTHROSCOPIC RECONSTRUCTION OF ACL OF RIGHT KNEE USING QUADRICEPS TENDON AUTOGRAFT: Primary | ICD-10-CM

## 2024-05-13 DIAGNOSIS — Z98.890 S/P ARTHROSCOPIC RECONSTRUCTION OF ACL OF RIGHT KNEE USING QUADRICEPS TENDON AUTOGRAFT: Primary | ICD-10-CM

## 2024-05-13 PROCEDURE — 97140 MANUAL THERAPY 1/> REGIONS: CPT | Performed by: PHYSICAL THERAPIST

## 2024-05-14 NOTE — PROGRESS NOTES
"Daily Note   Today's date: 24  Patient name: Ifeanyi Shin Jr.  : 2005  MRN: [id number]  Referring provider: Tony Snow MD  Dx:   Encounter Diagnosis     ICD-10-CM    1. S/P arthroscopic reconstruction of ACL of left knee using quadriceps tendon autograft  Z98.890     Z87.39         IEP 6348-9704  1 on 1 2285-4669  IEP 9413-3573  Start Time: 1115  Stop Time: 1215  Total time in clinic (min): 60 minutes    Subjective: Patient reports no new issues.  He said he was fatigued at the end of today's session because of running in the BOOST.       Objective: See treatment diary below  Knee Flexion: 138 deg    Assessment: Tolerated treatment well.  PT had patient run in the BOOST per MD recommendation.  PT will add more exercises in the near future because patient is allowed to progress at this time.  Patient demonstrated fatigue post treatment, exhibited good technique with therapeutic exercises, and would benefit from continued PT.       Plan: Continue per plan of care.  Continue to emphasize ROM and begin to progress strengthening.    Precautions: ACL Reconstruction performed on 10/6     Daily Treatment Log  Manuals    Tib Glides         GISTM  MP        MFDc  MP        PROM MP        TKE Stretching   GRC      Psoas Release   GRC      Neuro Re-Ed     NMES QS         NMES TC         BFR          Ther Ex    Bike  TM 10'  TM 10'  10' Curve 10'  Curve 5'    Heel slides         Wall Slides         Wall Slides Weighted 10 min on/off c MHP L Quad        Heel Prop         Squat with HR and Table Assist         EOT Flexion Stretch                   Standing clamshell / Lateral band walk MTB 30x 5\" ea RTB/Soccer   30 x 5\"   RTB2x   Resisted SS  Monster Walks   5x ea  RTB/Soccer   30 x 5\"   RTB2x   Resisted SS  Monster Walks   5x ea  RTB2x Resisted SS Monster Walks 5x ea RTB/Soccer   30x5\"   RTB2x   Resisted SS 5x  Monster Walks " "5x Black TB 2x   Resisted SS 3x  Monster Walks 3x   TKE   MTB 30x5\"       LAQ MTB 30x [place] #20  3 x 10  [place] #20  3 x 10  [place] #20 1 x 10  26# 1 x 10 [place] #25 4x10    Bird Dips  Surge 3 x 10 Surge 3 x 10 Surge 3 x 10 Surge 3 x 10 Surge 4 x 10 Surge 4 x 10    HR Squats Banded 30x Red TB #25kb 30x Red TB  #35KB 30x Red TB  #35KB 30x Red TB 35#KB 30x Red TB 35#KB    Slider HS Curls  MC 30x Eccentric Lower 30x  MC 30x   Ecc Lower 30x  30x  30x Butt Up   3 x 10     Hexbar DL  4/2/1 tempo 1 x 10 bar  2 x 10 +20lbs        Temper Tantrums 3 x 20s         Add Sliders   #15KB Pink TB   2 x 10 ea #15KB Pink TB   2 x 10 ea  #15KB Pink TB   2 x 10 ea #15KB Pink TB  3 x 10 ea   HS Curls   30 x 5\" ea 30 x 5\" ea  [place] #7.5 Prone   3 x 10 ea    Leg Press   #100 3 x 10 DL  #50 3 x 10 ea SL  #110 DL to SL  3 x 10 ea #100 1 x 12 DL  2x15  #50 3 x 12    #110 DL 3 x 10  #50 SL 3 x 10  #115 DL to SL   3 x 10 ea #125 DL 4 x 10    [place] curls    30x     Hip Flexion      #15KB 3x10x5\" ea   Ther Activity                           Gait Training      5/13   BOOST      L 50%   5' Retro; 15' JWJW            Modalities                               "

## 2024-05-16 ENCOUNTER — OFFICE VISIT (OUTPATIENT)
Dept: PHYSICAL THERAPY | Facility: OTHER | Age: 19
End: 2024-05-16
Payer: COMMERCIAL

## 2024-05-16 DIAGNOSIS — Z87.39 S/P ARTHROSCOPIC RECONSTRUCTION OF ACL OF RIGHT KNEE USING QUADRICEPS TENDON AUTOGRAFT: Primary | ICD-10-CM

## 2024-05-16 DIAGNOSIS — Z98.890 S/P ARTHROSCOPIC RECONSTRUCTION OF ACL OF RIGHT KNEE USING QUADRICEPS TENDON AUTOGRAFT: Primary | ICD-10-CM

## 2024-05-16 PROCEDURE — 97110 THERAPEUTIC EXERCISES: CPT

## 2024-05-16 PROCEDURE — 97140 MANUAL THERAPY 1/> REGIONS: CPT

## 2024-05-16 NOTE — PROGRESS NOTES
"Daily Note   Today's date: 24  Patient name: Ifeanyi Shin Jr.  : 2005  MRN: [id number]  Referring provider: Tony Snow MD  Dx:   Encounter Diagnosis     ICD-10-CM    1. S/P arthroscopic reconstruction of ACL of left knee using quadriceps tendon autograft  Z98.890     Z87.39           Start Time: 1400  Stop Time: 1515  Total time in clinic (min): 75 minutes    Subjective: Patient reports no new issues.    Objective: See treatment diary below    Assessment: Tolerated treatment well.  Tolerated progressions in BOOST well with no complaints. Added M4 tank drags fwd / retro / lateral with no complaints with LLE push-off. Barbell back squat limited by ankle DF limitations, improved with heels elevated. Required cueing for weight shift onto involved limb & L hip external rotation. Patient demonstrated fatigue post treatment, exhibited good technique with therapeutic exercises, and would benefit from continued PT.       Plan: Continue per plan of care.  Continue to emphasize ROM and begin to progress strengthening.    Precautions: ACL Reconstruction performed on 10/6     Daily Treatment Log  Manuals /   Tib Glides          GISTM  MP         MFDc  MP         PROM MP         TKE Stretching   GRC       Psoas Release   GRC       Neuro Re-Ed  / 5/6 16   NMES QS          NMES TC          BFR           Ther Ex  5/ 5/6 16   Bike  TM 10'  TM 10'  10' Curve 10'  Curve 5'     Heel slides          Wall Slides          Wall Slides Weighted 10 min on/off c MHP L Quad         Heel Prop          Squat with HR and Table Assist          EOT Flexion Stretch           ATG split squat       8\" box 3x10 c 4\" ecc             Standing clamshell / Lateral band walk MTB 30x 5\" ea RTB/Soccer   30 x 5\"   RTB2x   Resisted SS  Monster Walks   5x ea  RTB/Soccer   30 x 5\"   RTB2x   Resisted SS  Monster Walks   5x ea  RTB2x Resisted SS Monster Walks 5x ea " "RTB/Soccer   30x5\"   RTB2x   Resisted SS 5x  Monster Walks 5x Black TB 2x   Resisted SS 3x  Monster Walks 3x    TKE   MTB 30x5\"        LAQ MTB 30x [place] #20  3 x 10  [place] #20  3 x 10  [place] #20 1 x 10  26# 1 x 10 [place] #25 4x10  [place] 25# 4x15   Bird Dips  Surge 3 x 10 Surge 3 x 10 Surge 3 x 10 Surge 3 x 10 Surge 4 x 10 Surge 4 x 10     HR Squats Banded 30x Red TB #25kb 30x Red TB  #35KB 30x Red TB  #35KB 30x Red TB 35#KB 30x Red TB 35#KB  BB Hi- Squat H-E 65# 3x5   Slider HS Curls  MC 30x Eccentric Lower 30x  MC 30x   Ecc Lower 30x  30x  30x Butt Up   3 x 10      Hexbar DL  4/2/1 tempo 1 x 10 bar  2 x 10 +20lbs      BB RDL 75# 3x12 c 4 sec ecc   Temper Tantrums 3 x 20s          Add Sliders   #15KB Pink TB   2 x 10 ea #15KB Pink TB   2 x 10 ea  #15KB Pink TB   2 x 10 ea #15KB Pink TB  3 x 10 ea    HS Curls   30 x 5\" ea 30 x 5\" ea  [place] #7.5 Prone   3 x 10 ea     Leg Press   #100 3 x 10 DL  #50 3 x 10 ea SL  #110 DL to SL  3 x 10 ea #100 1 x 12 DL  2x15  #50 3 x 12    #110 DL 3 x 10  #50 SL 3 x 10  #115 DL to SL   3 x 10 ea #125 DL 4 x 10     [place] curls    30x      Hip Flexion      #15KB 3x10x5\" ea    Ther Activity          M4 Tank       Lvl 3 +Sal  Fwd / Retro 5x50'  Lateral 4x20'                       Gait Training      5/13 5/16   BOOST      L 50%   5' Retro; 15' JWJW L 70%  1':1' WJWJ retro x 3  1':2' WJWJ x4             Modalities                                  "

## 2024-05-20 ENCOUNTER — OFFICE VISIT (OUTPATIENT)
Dept: PHYSICAL THERAPY | Facility: OTHER | Age: 19
End: 2024-05-20
Payer: COMMERCIAL

## 2024-05-20 DIAGNOSIS — Z87.39 S/P ARTHROSCOPIC RECONSTRUCTION OF ACL OF RIGHT KNEE USING QUADRICEPS TENDON AUTOGRAFT: Primary | ICD-10-CM

## 2024-05-20 DIAGNOSIS — Z98.890 S/P ARTHROSCOPIC RECONSTRUCTION OF ACL OF RIGHT KNEE USING QUADRICEPS TENDON AUTOGRAFT: Primary | ICD-10-CM

## 2024-05-20 PROCEDURE — 97140 MANUAL THERAPY 1/> REGIONS: CPT | Performed by: PHYSICAL THERAPIST

## 2024-05-23 ENCOUNTER — OFFICE VISIT (OUTPATIENT)
Dept: PHYSICAL THERAPY | Facility: OTHER | Age: 19
End: 2024-05-23
Payer: COMMERCIAL

## 2024-05-23 DIAGNOSIS — Z98.890 S/P ARTHROSCOPIC RECONSTRUCTION OF ACL OF RIGHT KNEE USING QUADRICEPS TENDON AUTOGRAFT: Primary | ICD-10-CM

## 2024-05-23 DIAGNOSIS — Z87.39 S/P ARTHROSCOPIC RECONSTRUCTION OF ACL OF RIGHT KNEE USING QUADRICEPS TENDON AUTOGRAFT: Primary | ICD-10-CM

## 2024-05-23 PROCEDURE — 97140 MANUAL THERAPY 1/> REGIONS: CPT | Performed by: PHYSICAL THERAPIST

## 2024-05-23 NOTE — PROGRESS NOTES
"Daily Note   Today's date: 24  Patient name: Ifeanyi Shin Jr.  : 2005  MRN: [id number]  Referring provider: Tony Snow MD  Dx:   Encounter Diagnosis     ICD-10-CM    1. S/P arthroscopic reconstruction of ACL of left knee using quadriceps tendon autograft  Z98.890     Z87.39         IEP 6675-2868  1 on 1 8016-3364  IEP 7317-7310  Start Time: 1500  Stop Time: 1600  Total time in clinic (min): 60 minutes    Subjective: Patient reports he is feeling \"pretty good.\"  At the end of the session, he said he was exhausted.      Objective: See treatment diary below    Assessment: Tolerated treatment well.  Patient was fatigued at the end of today's session.       Plan: Continue per plan of care.  Continue to emphasize ROM and begin to progress strengthening.    Precautions: ACL Reconstruction performed on 10/6     Daily Treatment Log  Manuals    Tib Glides         GISTM          MFDc          PROM         TKE Stretching GRC        Psoas Release GRC        FR      GRC   Neuro Re-Ed      NMES QS         NMES TC         BFR          Ther Ex    Bike  TM 10'  10' Curve 10'  Curve 5'      Heel slides         Wall Slides         Wall Slides Weighted         Heel Prop         Squat with HR and Table Assist         EOT Flexion Stretch          ATG split squat     8\" box 3x10 c 4\" ecc 8\" box 3 x 10 ea            Standing clamshell / Lateral band walk RTB/Soccer   30 x 5\"   RTB2x   Resisted SS  Monster Walks   5x ea  RTB2x Resisted SS Monster Walks 5x ea RTB/Soccer   30x5\"   RTB2x   Resisted SS 5x  Monster Walks 5x Black TB 2x   Resisted SS 3x  Monster Walks 3x  Black TB 2x   Resisted SS 3x  Monster Walks 3x   TKE MTB 30x5\"         LAQ [place] #20  3 x 10  [place] #20 1 x 10  26# 1 x 10 [place] #25 4x10  [place] 25# 4x15 [place] 25# 4 x 15 ea   Bird Dips  Surge 3 x 10 Surge 3 x 10 Surge 4 x 10 Surge 4 x 10   Surge 4 x 10 ea   HR Squats Banded " "30x Red TB  #35KB 30x Red TB 35#KB 30x Red TB 35#KB  BB Hi- Squat H-E 65# 3x5    Slider HS Curls  30x  30x Butt Up   3 x 10       Hexbar DL     BB RDL 75# 3x12 c 4 sec ecc    Temper Tantrums         Add Sliders  #15KB Pink TB   2 x 10 ea  #15KB Pink TB   2 x 10 ea #15KB Pink TB  3 x 10 ea     HS Curls  30 x 5\" ea  [place] #7.5 Prone   3 x 10 ea      Leg Press #100 3 x 10 DL  #50 3 x 10 ea SL  #110 DL to SL  3 x 10 ea #100 1 x 12 DL  2x15  #50 3 x 12    #110 DL 3 x 10  #50 SL 3 x 10  #115 DL to SL   3 x 10 ea #125 DL 4 x 10   #150 DL 4 x 10   [place] curls  30x       Hip Flexion    #15KB 3x10x5\" ea     Ther Activity      5/20   M4 Tank     Lvl 3 +Sal  Fwd / Retro 5x50'  Lateral 4x20'    Agility Ladder      5x ea:   Double Tap  Single Tap  Typewriter  Icky   Rev Icky  Stutter Step   Assisted Band Hops       Purple   30x ea    Gait Training    5/13 5/16 5/20   BOOST    L 50%   5' Retro; 15' JWJW L 70%  1':1' WJWJ retro x 3  1':2' WJWJ x4 L 75%  5' Retro  15' JWJW            Modalities                               "

## 2024-05-25 NOTE — PROGRESS NOTES
"Daily Note   Today's date: 24  Patient name: Ifeanyi Shin Jr.  : 2005  MRN: [id number]  Referring provider: Tony Snow MD  Dx:   Encounter Diagnosis     ICD-10-CM    1. S/P arthroscopic reconstruction of ACL of left knee using quadriceps tendon autograft  Z98.890     Z87.39         IEP 1296-9421  1 on 1 6117-2246  IEP 1094-6500             Subjective: Patient reports he is feeling \"pretty good.\"  At the end of the session, he said he was exhausted.      Objective: See treatment diary below    Assessment: Tolerated treatment well.  Patient was fatigued at the end of today's session.       Plan: Continue per plan of care.  Continue to emphasize ROM and begin to progress strengthening.    Precautions: ACL Reconstruction performed on 10/6     Daily Treatment Log  Manuals    Tib Glides         GISTM          MFDc          PROM         TKE Stretching GRC        Psoas Release GRC        FR      GRC   Neuro Re-Ed      NMES QS         NMES TC         BFR          Ther Ex    Bike  TM 10'  10' Curve 10'  Curve 5'      Heel slides         Wall Slides         Wall Slides Weighted         Heel Prop         Squat with HR and Table Assist         EOT Flexion Stretch          ATG split squat     8\" box 3x10 c 4\" ecc 8\" box 3 x 10 ea            Standing clamshell / Lateral band walk RTB/Soccer   30 x 5\"   RTB2x   Resisted SS  Monster Walks   5x ea  RTB2x Resisted SS Monster Walks 5x ea RTB/Soccer   30x5\"   RTB2x   Resisted SS 5x  Monster Walks 5x Black TB 2x   Resisted SS 3x  Monster Walks 3x  Black TB 2x   Resisted SS 3x  Monster Walks 3x   TKE MTB 30x5\"         LAQ [place] #20  3 x 10  [place] #20 1 x 10  26# 1 x 10 [place] #25 4x10  [place] 25# 4x15 [place] 25# 4 x 15 ea   Bird Dips  Surge 3 x 10 Surge 3 x 10 Surge 4 x 10 Surge 4 x 10   Surge 4 x 10 ea   HR Squats Banded 30x Red TB  #35KB 30x Red TB 35#KB 30x Red TB 35#KB  BB Hi- " "Squat H-E 65# 3x5    Slider HS Curls  30x  30x Butt Up   3 x 10       Hexbar DL     BB RDL 75# 3x12 c 4 sec ecc    Temper Tantrums         Add Sliders  #15KB Pink TB   2 x 10 ea  #15KB Pink TB   2 x 10 ea #15KB Pink TB  3 x 10 ea     HS Curls  30 x 5\" ea  [place] #7.5 Prone   3 x 10 ea      Leg Press #100 3 x 10 DL  #50 3 x 10 ea SL  #110 DL to SL  3 x 10 ea #100 1 x 12 DL  2x15  #50 3 x 12    #110 DL 3 x 10  #50 SL 3 x 10  #115 DL to SL   3 x 10 ea #125 DL 4 x 10   #150 DL 4 x 10   [place] curls  30x       Hip Flexion    #15KB 3x10x5\" ea     Ther Activity      5/20   M4 Tank     Lvl 3 +Sal  Fwd / Retro 5x50'  Lateral 4x20'    Agility Ladder      5x ea:   Double Tap  Single Tap  Typewriter  Icky   Rev Icky  Stutter Step   Assisted Band Hops       Purple   30x ea    Gait Training    5/13 5/16 5/20   BOOST    L 50%   5' Retro; 15' JWJW L 70%  1':1' WJWJ retro x 3  1':2' WJWJ x4 L 75%  5' Retro  15' JWJW            Modalities                               "

## 2024-06-04 ENCOUNTER — OFFICE VISIT (OUTPATIENT)
Dept: PHYSICAL THERAPY | Facility: OTHER | Age: 19
End: 2024-06-04
Payer: COMMERCIAL

## 2024-06-04 DIAGNOSIS — Z98.890 S/P ARTHROSCOPIC RECONSTRUCTION OF ACL OF RIGHT KNEE USING QUADRICEPS TENDON AUTOGRAFT: Primary | ICD-10-CM

## 2024-06-04 DIAGNOSIS — Z87.39 S/P ARTHROSCOPIC RECONSTRUCTION OF ACL OF RIGHT KNEE USING QUADRICEPS TENDON AUTOGRAFT: Primary | ICD-10-CM

## 2024-06-04 PROCEDURE — 97140 MANUAL THERAPY 1/> REGIONS: CPT | Performed by: PHYSICAL THERAPIST

## 2024-06-06 ENCOUNTER — OFFICE VISIT (OUTPATIENT)
Dept: PHYSICAL THERAPY | Facility: OTHER | Age: 19
End: 2024-06-06
Payer: COMMERCIAL

## 2024-06-06 DIAGNOSIS — Z87.39 S/P ARTHROSCOPIC RECONSTRUCTION OF ACL OF RIGHT KNEE USING QUADRICEPS TENDON AUTOGRAFT: Primary | ICD-10-CM

## 2024-06-06 DIAGNOSIS — Z98.890 S/P ARTHROSCOPIC RECONSTRUCTION OF ACL OF RIGHT KNEE USING QUADRICEPS TENDON AUTOGRAFT: Primary | ICD-10-CM

## 2024-06-06 PROCEDURE — 97140 MANUAL THERAPY 1/> REGIONS: CPT | Performed by: PHYSICAL THERAPIST

## 2024-06-09 NOTE — PROGRESS NOTES
"Daily Note   Today's date: 24  Patient name: Ifeanyi Shin Jr.  : 2005  MRN: [id number]  Referring provider: Tony Snow MD  Dx:   Encounter Diagnosis     ICD-10-CM    1. S/P arthroscopic reconstruction of ACL of left knee using quadriceps tendon autograft  Z98.890     Z87.39         1 on 1   IEP              Subjective: Patient returns from [place].  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        Objective: See treatment diary below    Assessment: Tolerated treatment well.  Patient was fatigued at the end of today's session.        Plan: Continue per plan of care.  PT focused on agility and plyometrics today.  Consider BFR in the near future as well as more deadlifts and squats (squat rack).      Precautions: ACL Reconstruction performed on 10/6    Daily Treatment Log  Manuals  6/4    Tib Glides        GISTM    [facility]    MFDc         PROM        TKE Stretching        Psoas Release        FR  [facility]    Neuro Re-Ed   6/4    NMES QS        NMES TC        BFR         Ther Ex      Bike         Heel slides        Wall Slides        Wall Slides Weighted        Heel Prop        Squat with HR and Table Assist        EOT Flexion Stretch         ATG split squat 8\" box 3x10 c 4\" ecc 8\" box 3 x 10 ea              Standing clamshell / Lateral band walk  Black TB 2x   Resisted SS 3x  Monster Walks 3x Black TB 2x   Resisted SS 3x  Monster Walks 3x Black TB 2x   Resisted SS 5x  Monster Walks 5x    TKE        LAQ [place] 25# 4x15 [place] 25# 4 x 15 ea [place] 25# 4 x 15 ea [place] 25# 4 x 15 ea    Bird Dips   Surge 4 x 10 ea Surge 4 x 10  Surge 4 x 10 ea    HR Squats Banded BB Hi- Squat H-E 65# 3x5       Slider HS Curls         Hexbar DL BB RDL 75# 3x12 c 4 sec ecc       Temper Tantrums        Add Sliders    #20KB Pink TB  3 x 10 ea      HS Curls         Leg Press  #150 DL 4 x 10  " "#155 DL 4 x 10  #75 SL 4 x 10  #100 DL to SL 2 x 10    [place] curls        Hip Flexion        Ther Activity  5/20 5/23 6/4     M4 Tank Lvl 3 +Sal  Fwd / Retro 5x50'  Lateral 4x20'       Agility Ladder  5x ea:   Double Tap  Single Tap  Typewriter  Icky   Rev Icky  Stutter Step 3x ea:   Double Tap  Single Tap  Typewriter  Icky   Rev Icky  Stutter Step 5x ea:   Double Tap  Single Tap  Typewriter  Icky   Rev Icky  Stutter Step    Assisted Band Hops   Purple   30x ea  Purple   30x ea Purple   2 x 30 ea     Depth Drops    12\" 2 x 10  12\" 3 x 10    Box Jumps    12\" 2 x 10  12\" 3 x 10     Gait Training 5/16 5/20 5/23 6/4    BOOST L 70%  1':1' WJWJ retro x 3  1':2' WJWJ x4 L 75%  5' Retro  15' JWJW L 80%  5' Retro   15' JWJW     Treadmill     Jogging 10'     Modalities                          "

## 2024-06-10 ENCOUNTER — OFFICE VISIT (OUTPATIENT)
Dept: PHYSICAL THERAPY | Facility: OTHER | Age: 19
End: 2024-06-10
Payer: COMMERCIAL

## 2024-06-10 DIAGNOSIS — Z87.39 S/P ARTHROSCOPIC RECONSTRUCTION OF ACL OF RIGHT KNEE USING QUADRICEPS TENDON AUTOGRAFT: Primary | ICD-10-CM

## 2024-06-10 DIAGNOSIS — Z98.890 S/P ARTHROSCOPIC RECONSTRUCTION OF ACL OF RIGHT KNEE USING QUADRICEPS TENDON AUTOGRAFT: Primary | ICD-10-CM

## 2024-06-10 PROCEDURE — 97140 MANUAL THERAPY 1/> REGIONS: CPT | Performed by: PHYSICAL THERAPIST

## 2024-06-10 NOTE — PROGRESS NOTES
"Daily Note   Today's date: 06/10/24  Patient name: Ifeanyi Shin Jr.  : 2005  MRN: [id number]  Referring provider: Tony Snow MD  Dx:   Encounter Diagnosis     ICD-10-CM    1. S/P arthroscopic reconstruction of ACL of left knee using quadriceps tendon autograft  Z98.890     Z87.39         IEP [id number]  1 on 1 [id number]  Start Time: 700  Stop Time: 745  Total time in clinic (min): 45 minutes    Subjective: \"I feel pretty good.  I can't run today because I had two left shoes in my car and I only have my slides on.\"      Objective: See treatment diary below    Assessment: Tolerated treatment well.  PT began to work on squat form with patient.  Patient was able to do 3 sets of 8 repetitions at #135.  Patient had to leave early because he had a dentist appointment.          Plan: Continue per plan of care.       Precautions: ACL Reconstruction performed on 10/6    Daily Treatment Log  Manuals  6/10   Tib Glides        GISTM   GRC GRC GRC GRC   MFDc         PROM        TKE Stretching     GR   Psoas Release        FR GRC GRC GRC GRC    Femoral Nerve Glide     GRC   10x    BLLE Stretching     C    Neuro Re-Ed   6/10   NMES QS        NMES TC        BFR         Stooly Rides    3 x 1' ea 3 x 1' ea #25KB   Sup HS Curls    30 x 5\"     Planks     2 x 1' ea    Side Planks    2 x 1' ea    Hip Add Bridges    30 x 5\"             Ther Ex  6/10   Bike         Heel slides        Wall Slides        Wall Slides Weighted        Heel Prop                EOT Flexion Stretch         ATG split squat 8\" box 3 x 10 ea               Standing clamshell / Lateral band walk Black TB 2x   Resisted SS 3x  Monster Walks 3x Black TB 2x   Resisted SS 3x  Monster Walks 3x Black TB 2x   Resisted SS 5x  Monster Walks 5x Black TB 2x   Resisted SS 5x  Monster Walks 5x Black TB 2x   Resisted SS 5x  Monster Walks 5x   TKE        LAQ [place] 25# 4 x 15 ea [place] 25# 4 x 15 ea [place] 25# 4 x 15 " "ea [place] 25# 4 x 15 ea [place] #20 4 x 10 ea   HS Curls      Prone [place] #9  3 x 10   Prone [place] #9  Ramp 3 x 10    Bird Dips  Surge 4 x 10 ea Surge 4 x 10  Surge 4 x 10 ea Surge 4 x 10 ea    HR Squats Banded    #65 @ Rack  4 x 10   Emphasis on Form #135 @ Rack  3 x 8   Emphasis on Form   Slider HS Curls         Hexbar DL    #135 4 x 10     Temper Tantrums        Add Sliders   #20KB Pink TB  3 x 10 ea   #20KB Pink TB  3 x 10 ea     HS Curls         Leg Press #150 DL 4 x 10  #155 DL 4 x 10  #75 SL 4 x 10  #100 DL to SL 2 x 10 #155 DL 4 x 10  #75 SL 4 x 10  #100 DL to SL 2 x 10    [place] curls        Hip Flexion        Ther Activity 5/20 5/23 6/4  6/6    M4 Tank        Agility Ladder 5x ea:   Double Tap  Single Tap  Typewriter  Icky   Rev Icky  Stutter Step 3x ea:   Double Tap  Single Tap  Typewriter  Icky   Rev Icky  Stutter Step 5x ea:   Double Tap  Single Tap  Typewriter  Icky   Rev Icky  Stutter Step 3x ea:   Double Tap  Single Tap  Typewriter  Icky   Rev Icky  Stutter Step    Assisted Band Hops  Purple   30x ea  Purple   30x ea Purple   2 x 30 ea  Purple   2 x 30 ea    Depth Drops   12\" 2 x 10  12\" 3 x 10 12\" 3 x 10     Box Jumps   12\" 2 x 10  12\" 3 x 10  12\" 3 x 10     Gait Training 5/20 5/23 6/4 6/6    BOOST L 75%  5' Retro  15' JWJW L 80%  5' Retro   15' JWJW  L 80%  5' Retro   15' JWJW    Treadmill    Jogging 10'      Modalities                          "

## 2024-06-11 ENCOUNTER — APPOINTMENT (OUTPATIENT)
Dept: PHYSICAL THERAPY | Facility: OTHER | Age: 19
End: 2024-06-11
Payer: COMMERCIAL

## 2024-06-19 ENCOUNTER — OFFICE VISIT (OUTPATIENT)
Dept: PHYSICAL THERAPY | Facility: OTHER | Age: 19
End: 2024-06-19
Payer: COMMERCIAL

## 2024-06-19 DIAGNOSIS — Z98.890 S/P ARTHROSCOPIC RECONSTRUCTION OF ACL OF RIGHT KNEE USING QUADRICEPS TENDON AUTOGRAFT: Primary | ICD-10-CM

## 2024-06-19 DIAGNOSIS — Z87.39 S/P ARTHROSCOPIC RECONSTRUCTION OF ACL OF RIGHT KNEE USING QUADRICEPS TENDON AUTOGRAFT: Primary | ICD-10-CM

## 2024-06-19 PROCEDURE — 97110 THERAPEUTIC EXERCISES: CPT

## 2024-06-19 NOTE — PROGRESS NOTES
"Daily Note   Today's date: 24  Patient name: Ifeanyi Shin Jr.  : 2005  MRN: [id number]  Referring provider: Tony Snow MD  Dx:   Encounter Diagnosis     ICD-10-CM    1. S/P arthroscopic reconstruction of ACL of left knee using quadriceps tendon autograft  Z98.890     Z87.39           Start Time: 1745  Stop Time: 1808  Total time in clinic (min): 23 minutes    Subjective: Pt asking for a comprehensive lower body routine for the gym.    Objective: See treatment diary below    Assessment: Tolerated treatment well. Running analysis today reveals antalgic gait with decreased L stride length, decreased L knee extension at initial contact. Normal vertical displacement. Has full passive L knee extension. Deficits still present in L quad strength resulting in difficulty with repetitive load-bearing as well as decreased confidence in absorbing load onto LLE. Strength deficit evident by inability to perform SL pistol squat to 18\" bench without assistance. Pt would benefit from continued skilled PT.    Plan: Continue per plan of care.       Precautions: ACL Reconstruction performed on 10/6    Daily Treatment Log  Manuals 5/20 5/23 6/4 6/6 6/10 6/19   Tib Glides         GISTM   GRC GRC GRC GRC    MFDc          PROM         TKE Stretching     GRC    Psoas Release         FR GRC GRC GRC GRC     Femoral Nerve Glide     GRC   10x     BLLE Stretching     GRC     Neuro Re-Ed  5/20 5/23 6/4 6/6 6/10    NMES QS         NMES TC         BFR          Stooly Rides    3 x 1' ea 3 x 1' ea #25KB    Sup HS Curls    30 x 5\"      Planks     2 x 1' ea     Side Planks    2 x 1' ea     Hip Add Bridges    30 x 5\"               Ther Ex 5/20 5/23  6/6 6/10 6/19   Bike          Heel slides         Wall Slides         Wall Slides Weighted         Heel Prop                  EOT Flexion Stretch          ATG split squat 8\" box 3 x 10 ea                 Standing clamshell / Lateral band walk Black TB 2x   Resisted SS 3x  Monster Walks " "3x Black TB 2x   Resisted SS 3x  Monster Walks 3x Black TB 2x   Resisted SS 5x  Monster Walks 5x Black TB 2x   Resisted SS 5x  Monster Walks 5x Black TB 2x   Resisted SS 5x  Monster Walks 5x    TKE         LAQ [place] 25# 4 x 15 ea [place] 25# 4 x 15 ea [place] 25# 4 x 15 ea [place] 25# 4 x 15 ea [place] #20 4 x 10 ea [place] 15# 3x20   HS Curls      Prone [place] #9  3 x 10   Prone [place] #9  Ramp 3 x 10     Bird Dips  Surge 4 x 10 ea Surge 4 x 10  Surge 4 x 10 ea Surge 4 x 10 ea     HR Squats Banded    #65 @ Rack  4 x 10   Emphasis on Form #135 @ Rack  3 x 8   Emphasis on Form    Slider HS Curls          Hexbar DL    #135 4 x 10      Temper Tantrums         Add Sliders   #20KB Pink TB  3 x 10 ea   #20KB Pink TB  3 x 10 ea      HS Curls       Pball 3x12   Leg Press #150 DL 4 x 10  #155 DL 4 x 10  #75 SL 4 x 10  #100 DL to SL 2 x 10 #155 DL 4 x 10  #75 SL 4 x 10  #100 DL to SL 2 x 10  80# SL 3x15   RFESS      4x5, emphasis on concentric speed   Pistol squat      22\" c med loop band asst 3x10   [place] curls         Hip Flexion         Ther Activity 5/20 5/23 6/4  6/6     M4 Tank         Agility Ladder 5x ea:   Double Tap  Single Tap  Typewriter  Icky   Rev Icky  Stutter Step 3x ea:   Double Tap  Single Tap  Typewriter  Icky   Rev Icky  Stutter Step 5x ea:   Double Tap  Single Tap  Typewriter  Icky   Rev Icky  Stutter Step 3x ea:   Double Tap  Single Tap  Typewriter  Icky   Rev Icky  Stutter Step     Assisted Band Hops  Purple   30x ea  Purple   30x ea Purple   2 x 30 ea  Purple   2 x 30 ea     Depth Drops   12\" 2 x 10  12\" 3 x 10 12\" 3 x 10      Box Jumps   12\" 2 x 10  12\" 3 x 10  12\" 3 x 10      Gait Training 5/20 5/23 6/4 6/6     BOOST L 75%  5' Retro  15' JWJW L 80%  5' Retro   15' JWJW  L 80%  5' Retro   15' JWJW  L 75% WJS 30:30:10 x8   Treadmill    Jogging 10'       Modalities                             "

## 2024-06-20 ENCOUNTER — OFFICE VISIT (OUTPATIENT)
Dept: PHYSICAL THERAPY | Facility: OTHER | Age: 19
End: 2024-06-20
Payer: COMMERCIAL

## 2024-06-20 DIAGNOSIS — Z98.890 S/P ARTHROSCOPIC RECONSTRUCTION OF ACL OF RIGHT KNEE USING QUADRICEPS TENDON AUTOGRAFT: Primary | ICD-10-CM

## 2024-06-20 DIAGNOSIS — Z87.39 S/P ARTHROSCOPIC RECONSTRUCTION OF ACL OF RIGHT KNEE USING QUADRICEPS TENDON AUTOGRAFT: Primary | ICD-10-CM

## 2024-06-20 PROCEDURE — 97140 MANUAL THERAPY 1/> REGIONS: CPT | Performed by: PHYSICAL THERAPIST

## 2024-06-22 NOTE — PROGRESS NOTES
"Daily Note   Today's date: 24  Patient name: Ifeanyi Shin Jr.  : 2005  MRN: [id number]  Referring provider: Tony Snow MD  Dx:   Encounter Diagnosis     ICD-10-CM    1. S/P arthroscopic reconstruction of ACL of left knee using quadriceps tendon autograft  Z98.890     Z87.39         1 on 1 [id number]  IEP [id number]  Start Time: 1030  Stop Time: 1115  Total time in clinic (min): 45 minutes    Subjective: PT and patient spoke about PT creating him a lifting program.  PT was agreeable and will give him one next week.      Objective: See treatment diary below    Assessment: Tolerated treatment well. Patient and PT spoke about what SP PT told him last session.  PT was in agreement.  PT focused on quad strengthening today.  PT will isokinetic test patient in the near future.        Plan: Continue per plan of care.       Precautions: ACL Reconstruction performed on 10/6    Daily Treatment Log  Manuals 5/20 5/23 6/4 6/6 6/10 6/19 6/20   Tib Glides          GISTM   GRC GRC GRC GRC  GRC   MFDc           PROM          TKE Stretching     GRC     Psoas Release          FR GRC GRC GRC GRC      Femoral Nerve Glide     GRC   10x      BLLE Stretching     GRC      Neuro Re-Ed  5/20 5/23 6/4 6/6 6/10  6/20   NMES QS          NMES TC          BFR           Stooly Rides    3 x 1' ea 3 x 1' ea #25KB     Sup HS Curls    30 x 5\"       Planks     2 x 1' ea      Side Planks    2 x 1' ea      Hip Add Bridges    30 x 5\"       Hi Kneel Squats        Barbell 3 x 10  Barbell w/ Band 3x10   Hi Kneel Squats w/ Band       Pulled pt AP at angle to the R   Purple 3 x 10    Rev [place] Curls       Bungee 3 x 10    SL Split Squats       3 x 10 BW   TRX SL Squats       1 x 10    Ther Ex 5/20 5/23  6/6 6/10 6/19 6/20   Bike        10'    Heel slides          Wall Slides          Wall Slides Weighted          Heel Prop                    EOT Flexion Stretch           ATG split squat 8\" box 3 x 10 ea                   Standing clamshell " "/ Lateral band walk Black TB 2x   Resisted SS 3x  Monster Walks 3x Black TB 2x   Resisted SS 3x  Monster Walks 3x Black TB 2x   Resisted SS 5x  Monster Walks 5x Black TB 2x   Resisted SS 5x  Monster Walks 5x Black TB 2x   Resisted SS 5x  Monster Walks 5x     TKE          LAQ [place] 25# 4 x 15 ea [place] 25# 4 x 15 ea [place] 25# 4 x 15 ea [place] 25# 4 x 15 ea [place]a #20 4 x 10 ea [place] 15# 3x20    HS Curls      Prone [place] #9  3 x 10   Prone [place] #9  Ramp 3 x 10      Bird Dips  Surge 4 x 10 ea Surge 4 x 10  Surge 4 x 10 ea Surge 4 x 10 ea      HR Squats Banded    #65 @ Rack  4 x 10   Emphasis on Form #135 @ Rack  3 x 8   Emphasis on Form     Slider HS Curls           Hexbar DL    #135 4 x 10       Temper Tantrums          Add Sliders   #20KB Pink TB  3 x 10 ea   #20KB Pink TB  3 x 10 ea       HS Curls       Pball 3x12    Leg Press #150 DL 4 x 10  #155 DL 4 x 10  #75 SL 4 x 10  #100 DL to SL 2 x 10 #155 DL 4 x 10  #75 SL 4 x 10  #100 DL to SL 2 x 10  80# SL 3x15    RFESS      4x5, emphasis on concentric speed    Pistol squat      22\" c med loop band asst 3x10    [place] curls          Hip Flexion          Ther Activity 5/20 5/23 6/4  6/6      M4 Tank          Agility Ladder 5x ea:   Double Tap  Single Tap  Typewriter  Icky   Rev Icky  Stutter Step 3x ea:   Double Tap  Single Tap  Typewriter  Icky   Rev Icky  Stutter Step 5x ea:   Double Tap  Single Tap  Typewriter  Icky   Rev Icky  Stutter Step 3x ea:   Double Tap  Single Tap  Typewriter  Icky   Rev Icky  Stutter Step      Assisted Band Hops  Purple   30x ea  Purple   30x ea Purple   2 x 30 ea  Purple   2 x 30 ea      Depth Drops   12\" 2 x 10  12\" 3 x 10 12\" 3 x 10       Box Jumps   12\" 2 x 10  12\" 3 x 10  12\" 3 x 10       Gait Training 5/20 5/23 6/4 6/6      BOOST L 75%  5' Retro  15' JWJW L 80%  5' Retro   15' JWJW  L 80%  5' Retro   15' JWJW  L 75% WJS 30:30:10 x8    Treadmill    Jogging 10'        Modalities                                "

## 2024-06-26 ENCOUNTER — OFFICE VISIT (OUTPATIENT)
Dept: PHYSICAL THERAPY | Facility: OTHER | Age: 19
End: 2024-06-26
Payer: COMMERCIAL

## 2024-06-26 DIAGNOSIS — Z87.39 S/P ARTHROSCOPIC RECONSTRUCTION OF ACL OF RIGHT KNEE USING QUADRICEPS TENDON AUTOGRAFT: Primary | ICD-10-CM

## 2024-06-26 DIAGNOSIS — Z98.890 S/P ARTHROSCOPIC RECONSTRUCTION OF ACL OF RIGHT KNEE USING QUADRICEPS TENDON AUTOGRAFT: Primary | ICD-10-CM

## 2024-06-26 PROCEDURE — 97140 MANUAL THERAPY 1/> REGIONS: CPT | Performed by: PHYSICAL THERAPIST

## 2024-06-30 NOTE — PROGRESS NOTES
"Daily Note   Today's date: 24  Patient name: Ifeanyi Shin Jr.  : 2005  MRN: [id number]  Referring provider: Tony Snow MD  Dx:   Encounter Diagnosis     ICD-10-CM    1. S/P arthroscopic reconstruction of ACL of left knee using quadriceps tendon autograft  Z98.890     Z87.39         1 on 1 6614-0203  IEP [id number]             Subjective: PT and patient spoke about PT creating him a lifting program.  PT was agreeable and will give him one next week.      Objective: See treatment diary below    Assessment: Tolerated treatment well. Patient and PT spoke about what SP PT told him last session.  PT was in agreement.  PT focused on quad strengthening today.  PT will isokinetic test patient in the near future.        Plan: Continue per plan of care.       Precautions: ACL Reconstruction performed on 10/6    Daily Treatment Log  Manuals 5/20 5/23 6/4 6/6 6/10 6/19 6/20   Tib Glides          GISTM   GRC GRC GRC GRC  GRC   MFDc           PROM          TKE Stretching     GR     Psoas Release          FR GRC GRC GRC GRC      Femoral Nerve Glide     [facility]   10x      BLLE Stretching     [facility]      Neuro Re-Ed  5/20 5/23 6/4 6/6 6/10  6/20   NMES QS          NMES TC          BFR           Stooly Rides    3 x 1' ea 3 x 1' ea #25KB     Sup HS Curls    30 x 5\"       Planks     2 x 1' ea      Side Planks    2 x 1' ea      Hip Add Bridges    30 x 5\"       Hi Kneel Squats        Barbell 3 x 10  Barbell w/ Band 3x10   Hi Kneel Squats w/ Band       Pulled pt AP at angle to the R   Purple 3 x 10    Rev Nordic Curls       Bungee 3 x 10    SL Split Squats       3 x 10 BW   TRX SL Squats       1 x 10    Ther Ex 5/20 5/23  6/6 6/10 6/19 6/20   Bike        10'    Heel slides          Wall Slides          Wall Slides Weighted          Heel Prop                    EOT Flexion Stretch           ATG split squat 8\" box 3 x 10 ea                   Standing clamshell / Lateral band walk Black TB 2x   Resisted SS 3x  Monster Walks " "3x Black TB 2x   Resisted SS 3x  Monster Walks 3x Black TB 2x   Resisted SS 5x  Monster Walks 5x Black TB 2x   Resisted SS 5x  Monster Walks 5x Black TB 2x   Resisted SS 5x  Monster Walks 5x     TKE          LAQ [place] 25# 4 x 15 ea [place] 25# 4 x 15 ea [place] 25# 4 x 15 ea [place] 25# 4 x 15 ea [place] #20 4 x 10 ea [place] 15# 3x20    HS Curls      Prone [place] #9  3 x 10   Prone [place] #9  Ramp 3 x 10      Bird Dips  Surge 4 x 10 ea Surge 4 x 10  Surge 4 x 10 ea Surge 4 x 10 ea      HR Squats Banded    #65 @ Rack  4 x 10   Emphasis on Form #135 @ Rack  3 x 8   Emphasis on Form     Slider HS Curls           Hexbar DL    #135 4 x 10       Temper Tantrums          Add Sliders   #20KB Pink TB  3 x 10 ea   #20KB Pink TB  3 x 10 ea       HS Curls       Pball 3x12    Leg Press #150 DL 4 x 10  #155 DL 4 x 10  #75 SL 4 x 10  #100 DL to SL 2 x 10 #155 DL 4 x 10  #75 SL 4 x 10  #100 DL to SL 2 x 10  80# SL 3x15    RFESS      4x5, emphasis on concentric speed    Pistol squat      22\" c med loop band asst 3x10    [place] curls          Hip Flexion          Ther Activity 5/20 5/23 6/4  6/6      M4 Tank          Agility Ladder 5x ea:   Double Tap  Single Tap  Typewriter  Icky   Rev Icky  Stutter Step 3x ea:   Double Tap  Single Tap  Typewriter  Icky   Rev Icky  Stutter Step 5x ea:   Double Tap  Single Tap  Typewriter  Icky   Rev Icky  Stutter Step 3x ea:   Double Tap  Single Tap  Typewriter  Icky   Rev Icky  Stutter Step      Assisted Band Hops  Purple   30x ea  Purple   30x ea Purple   2 x 30 ea  Purple   2 x 30 ea      Depth Drops   12\" 2 x 10  12\" 3 x 10 12\" 3 x 10       Box Jumps   12\" 2 x 10  12\" 3 x 10  12\" 3 x 10       Gait Training 5/20 5/23 6/4 6/6      BOOST L 75%  5' Retro  15' JWJW L 80%  5' Retro   15' JWJW  L 80%  5' Retro   15' JWJW  L 75% WJS 30:30:10 x8    Treadmill    Jogging 10'        Modalities                                "

## 2024-07-01 ENCOUNTER — OFFICE VISIT (OUTPATIENT)
Dept: PHYSICAL THERAPY | Facility: OTHER | Age: 19
End: 2024-07-01
Payer: COMMERCIAL

## 2024-07-01 DIAGNOSIS — Z87.39 S/P ARTHROSCOPIC RECONSTRUCTION OF ACL OF RIGHT KNEE USING QUADRICEPS TENDON AUTOGRAFT: Primary | ICD-10-CM

## 2024-07-01 DIAGNOSIS — Z98.890 S/P ARTHROSCOPIC RECONSTRUCTION OF ACL OF RIGHT KNEE USING QUADRICEPS TENDON AUTOGRAFT: Primary | ICD-10-CM

## 2024-07-01 PROCEDURE — 97140 MANUAL THERAPY 1/> REGIONS: CPT | Performed by: PHYSICAL THERAPIST

## 2024-07-02 ENCOUNTER — OFFICE VISIT (OUTPATIENT)
Dept: PHYSICAL THERAPY | Facility: OTHER | Age: 19
End: 2024-07-02
Payer: COMMERCIAL

## 2024-07-02 DIAGNOSIS — Z98.890 S/P ARTHROSCOPIC RECONSTRUCTION OF ACL OF RIGHT KNEE USING QUADRICEPS TENDON AUTOGRAFT: Primary | ICD-10-CM

## 2024-07-02 DIAGNOSIS — Z87.39 S/P ARTHROSCOPIC RECONSTRUCTION OF ACL OF RIGHT KNEE USING QUADRICEPS TENDON AUTOGRAFT: Primary | ICD-10-CM

## 2024-07-02 PROCEDURE — 97140 MANUAL THERAPY 1/> REGIONS: CPT | Performed by: PHYSICAL THERAPIST

## 2024-07-07 NOTE — PROGRESS NOTES
"Daily Note   Today's date: 24  Patient name: Ifeanyi Shin Jr.  : 2005  MRN: [id number]  Referring provider: Tony Snow MD  Dx:   Encounter Diagnosis     ICD-10-CM    1. S/P arthroscopic reconstruction of ACL of left knee using quadriceps tendon autograft  Z98.890     Z87.39         1 on 1 [id number]  IEP [id number]             Subjective: PT and patient spoke about PT creating him a lifting program.  PT was agreeable and will give him one next week.      Objective: See treatment diary below    Assessment: Tolerated treatment well. Patient and PT spoke about what SP PT told him last session.  PT was in agreement.  PT focused on quad strengthening today.  PT will isokinetic test patient in the near future.        Plan: Continue per plan of care.       Precautions: ACL Reconstruction performed on 10/6    Daily Treatment Log  Manuals 5/20 5/23 6/4 6/6 6/10 6/19 6/20   Tib Glides          GISTM   GRC GRC GRC GRC  GRC   MFDc           PROM          TKE Stretching     GR     Psoas Release          FR GRC GRC GRC GRC      Femoral Nerve Glide     [facility]   10x      BLLE Stretching     [facility]      Neuro Re-Ed  5/20 5/23 6/4 6/6 6/10  6/20   NMES QS          NMES TC          BFR           Stooly Rides    3 x 1' ea 3 x 1' ea #25KB     Sup HS Curls    30 x 5\"       Planks     2 x 1' ea      Side Planks    2 x 1' ea      Hip Add Bridges    30 x 5\"       Hi Kneel Squats        Barbell 3 x 10  Barbell w/ Band 3x10   Hi Kneel Squats w/ Band       Pulled pt AP at angle to the R   Purple 3 x 10    Rev Nordic Curls       Bungee 3 x 10    SL Split Squats       3 x 10 BW   TRX SL Squats       1 x 10    Ther Ex 5/20 5/23  6/6 6/10 6/19 6/20   Bike        10'    Heel slides          Wall Slides          Wall Slides Weighted          Heel Prop                    EOT Flexion Stretch           ATG split squat 8\" box 3 x 10 ea                   Standing clamshell / Lateral band walk Black TB 2x   Resisted SS 3x  Monster Walks " "3x Black TB 2x   Resisted SS 3x  Monster Walks 3x Black TB 2x   Resisted SS 5x  Monster Walks 5x Black TB 2x   Resisted SS 5x  Monster Walks 5x Black TB 2x   Resisted SS 5x  Monster Walks 5x     TKE          LAQ [place] 25# 4 x 15 ea [place] 25# 4 x 15 ea [place] 25# 4 x 15 ea [place] 25# 4 x 15 ea [place] #20 4 x 10 ea [place] 15# 3x20    HS Curls      Prone [place] #9  3 x 10   Prone [place] #9  Ramp 3 x 10      Bird Dips  Surge 4 x 10 ea Surge 4 x 10  Surge 4 x 10 ea Surge 4 x 10 ea      HR Squats Banded    #65 @ Rack  4 x 10   Emphasis on Form #135 @ Rack  3 x 8   Emphasis on Form     Slider HS Curls           Hexbar DL    #135 4 x 10       Temper Tantrums          Add Sliders   #20KB Pink TB  3 x 10 ea   #20KB Pink TB  3 x 10 ea       HS Curls       Pball 3x12    Leg Press #150 DL 4 x 10  #155 DL 4 x 10  #75 SL 4 x 10  #100 DL to SL 2 x 10 #155 DL 4 x 10  #75 SL 4 x 10  #100 DL to SL 2 x 10  80# SL 3x15    RFESS      4x5, emphasis on concentric speed    Pistol squat      22\" c med loop band asst 3x10    [place] curls          Hip Flexion          Ther Activity 5/20 5/23 6/4  6/6      M4 Tank          Agility Ladder 5x ea:   Double Tap  Single Tap  Typewriter  Icky   Rev Icky  Stutter Step 3x ea:   Double Tap  Single Tap  Typewriter  Icky   Rev Icky  Stutter Step 5x ea:   Double Tap  Single Tap  Typewriter  Icky   Rev Icky  Stutter Step 3x ea:   Double Tap  Single Tap  Typewriter  Icky   Rev Icky  Stutter Step      Assisted Band Hops  Purple   30x ea  Purple   30x ea Purple   2 x 30 ea  Purple   2 x 30 ea      Depth Drops   12\" 2 x 10  12\" 3 x 10 12\" 3 x 10       Box Jumps   12\" 2 x 10  12\" 3 x 10  12\" 3 x 10       Gait Training 5/20 5/23 6/4 6/6      BOOST L 75%  5' Retro  15' JWJW L 80%  5' Retro   15' JWJW  L 80%  5' Retro   15' JWJW  L 75% WJS 30:30:10 x8    Treadmill    Jogging 10'        Modalities                                "

## 2024-07-07 NOTE — PROGRESS NOTES
"Daily Note   Today's date: 24  Patient name: Ifeanyi Shin Jr.  : 2005  MRN: [id number]  Referring provider: Tony Snow MD  Dx:   Encounter Diagnosis     ICD-10-CM    1. S/P arthroscopic reconstruction of ACL of left knee using quadriceps tendon autograft  Z98.890     Z87.39         1 on 1 5261-3989  IEP 4675-2110             Subjective: PT and patient spoke about PT creating him a lifting program.  PT was agreeable and will give him one next week.      Objective: See treatment diary below    Assessment: Tolerated treatment well. Patient and PT spoke about what SP PT told him last session.  PT was in agreement.  PT focused on quad strengthening today.  PT will isokinetic test patient in the near future.        Plan: Continue per plan of care.       Precautions: ACL Reconstruction performed on 10/6    Daily Treatment Log  Manuals 5/20 5/23 6/4 6/6 6/10 6/19 6/20   Tib Glides          GISTM   GRC GRC GRC GRC  GRC   MFDc           PROM          TKE Stretching     GR     Psoas Release          FR GRC GRC GRC GRC      Femoral Nerve Glide     [facility]   10x      BLLE Stretching     [facility]      Neuro Re-Ed  5/20 5/23 6/4 6/6 6/10  6/20   NMES QS          NMES TC          BFR           Stooly Rides    3 x 1' ea 3 x 1' ea #25KB     Sup HS Curls    30 x 5\"       Planks     2 x 1' ea      Side Planks    2 x 1' ea      Hip Add Bridges    30 x 5\"       Hi Kneel Squats        Barbell 3 x 10  Barbell w/ Band 3x10   Hi Kneel Squats w/ Band       Pulled pt AP at angle to the R   Purple 3 x 10    Rev Nordic Curls       Bungee 3 x 10    SL Split Squats       3 x 10 BW   TRX SL Squats       1 x 10    Ther Ex 5/20 5/23  6/6 6/10 6/19 6/20   Bike        10'    Heel slides          Wall Slides          Wall Slides Weighted          Heel Prop                    EOT Flexion Stretch           ATG split squat 8\" box 3 x 10 ea                   Standing clamshell / Lateral band walk Black TB 2x   Resisted SS 3x  Monster Walks " "3x Black TB 2x   Resisted SS 3x  Monster Walks 3x Black TB 2x   Resisted SS 5x  Monster Walks 5x Black TB 2x   Resisted SS 5x  Monster Walks 5x Black TB 2x   Resisted SS 5x  Monster Walks 5x     TKE          LAQ [place] 25# 4 x 15 ea [place] 25# 4 x 15 ea Dor[place]a 25# 4 x 15 ea [place] 25# 4 x 15 ea [place] #20 4 x 10 ea [place]a 15# 3x20    HS Curls      Prone [place] #9  3 x 10   Prone [place] #9  Ramp 3 x 10      Bird Dips  Surge 4 x 10 ea Surge 4 x 10  Surge 4 x 10 ea Surge 4 x 10 ea      HR Squats Banded    #65 @ Rack  4 x 10   Emphasis on Form #135 @ Rack  3 x 8   Emphasis on Form     Slider HS Curls           Hexbar DL    #135 4 x 10       Temper Tantrums          Add Sliders   #20KB Pink TB  3 x 10 ea   #20KB Pink TB  3 x 10 ea       HS Curls       Pball 3x12    Leg Press #150 DL 4 x 10  #155 DL 4 x 10  #75 SL 4 x 10  #100 DL to SL 2 x 10 #155 DL 4 x 10  #75 SL 4 x 10  #100 DL to SL 2 x 10  80# SL 3x15    RFESS      4x5, emphasis on concentric speed    Pistol squat      22\" c med loop band asst 3x10    [place] curls          Hip Flexion          Ther Activity 5/20 5/23 6/4  6/6      M4 Tank          Agility Ladder 5x ea:   Double Tap  Single Tap  Typewriter  Icky   Rev Icky  Stutter Step 3x ea:   Double Tap  Single Tap  Typewriter  Icky   Rev Icky  Stutter Step 5x ea:   Double Tap  Single Tap  Typewriter  Icky   Rev Icky  Stutter Step 3x ea:   Double Tap  Single Tap  Typewriter  Icky   Rev Icky  Stutter Step      Assisted Band Hops  Purple   30x ea  Purple   30x ea Purple   2 x 30 ea  Purple   2 x 30 ea      Depth Drops   12\" 2 x 10  12\" 3 x 10 12\" 3 x 10       Box Jumps   12\" 2 x 10  12\" 3 x 10  12\" 3 x 10       Gait Training 5/20 5/23 6/4 6/6      BOOST L 75%  5' Retro  15' JWJW L 80%  5' Retro   15' JWJW  L 80%  5' Retro   15' JWJW  L 75% WJS 30:30:10 x8    Treadmill    Jogging 10'        Modalities                                "

## 2024-07-08 ENCOUNTER — OFFICE VISIT (OUTPATIENT)
Dept: OBGYN CLINIC | Facility: CLINIC | Age: 19
End: 2024-07-08
Payer: COMMERCIAL

## 2024-07-08 VITALS — HEIGHT: 70 IN | BODY MASS INDEX: 30.49 KG/M2 | WEIGHT: 213 LBS

## 2024-07-08 DIAGNOSIS — Z98.890 STATUS POST ANTERIOR CRUCIATE LIGAMENT SURGERY: Primary | ICD-10-CM

## 2024-07-08 PROCEDURE — 99213 OFFICE O/P EST LOW 20 MIN: CPT | Performed by: ORTHOPAEDIC SURGERY

## 2024-07-08 NOTE — PROGRESS NOTES
CHIEF COMPLAINT / REASON FOR VISIT  Ifeanyi Shin  is a 19 y.o. who is following up today to discuss the results of his recent imaging study.    HISTORY OF PRESENT ILLNESS  {MJKPATIENTSTATUS:83338}     OBJECTIVE  {MJKLEFTRIGHTBILATERAL:14094} {MJKBODYPART:11249}    {MJKBODYPARTEXAM:01227}    DIAGNOSTIC IMAGING:  No results found.    ASSESSMENT/PLAN:          Scribe Attestation      I,:   am acting as a scribe while in the presence of the attending physician.:       I,:   personally performed the services described in this documentation    as scribed in my presence.:

## 2024-07-08 NOTE — PROGRESS NOTES
Subjective   CHIEF COMPLAINT/REASON FOR VISIT  Ifeanyi Shin Jr. is a 19 y.o. male who presents for  4 month post op follow-up after ARTHROSCOPY KNEE for lysis of adhesive lesions and debridement,manipulation under anesthesia, - Left and Manipulation Joint Knee - Left on 3/13/2024     HISTORY OF PRESENT ILLNESS  Overall, he feels things are going well and progressing appropriately. He has been following the post-operative rehabilitation regimen without difficultly. Currently, he is doing well without any specific concerns.  He is continue to work with physical therapy.  Patient's original ACL surgery was October 6, 2023.    Objective   PHYSICAL EXAMINATION  Musculoskeletal: left Knee Examination:  General: The patient is alert, oriented, and pleasant to interact with.  Patient ambulates with Normal gait pattern  Assistive Device: No  Alignment: normal  Skin is warm and dry to touch with no signs of erythema, ecchymosis, or infection   Effusion: none  ROM: 0° - 135°    MMT: 5/5 throughout  TTP: None  Flexor and extensor mechanisms are intact   Knee is stable to varus and valgus stress  Du's Test Negative    Lachman's Test - 1A  2+ DP and PT pulses with brisk capillary refill to the toes  Sural, saphenous, tibial, superficial, and deep peroneal motor and sensory distributions intact  Sensation light touch intact distally    Assessment & Plan   1. Status Post ARTHROSCOPY KNEE for lysis of adhesive lesions and debridement,manipulation under anesthesia, - Left and Manipulation Joint Knee - Left    He is doing well after surgery and making appropriate progress. Encouraged to continue working on quadriceps strength with PT. Advised he may return to spring football.     We will plan to see him back around fall 2024. If any issues, questions, or concerns arise between now and the next appointment, we have encouraged the patient contact our team.    Scribe Attestation      I,:  Bong Han PA-C am acting as  a scribe while in the presence of the attending physician.:       I,:  Deo Alcantar MD personally performed the services described in this documentation    as scribed in my presence.:

## 2024-07-09 ENCOUNTER — OFFICE VISIT (OUTPATIENT)
Dept: PHYSICAL THERAPY | Facility: OTHER | Age: 19
End: 2024-07-09
Payer: COMMERCIAL

## 2024-07-09 DIAGNOSIS — Z98.890 S/P ARTHROSCOPIC RECONSTRUCTION OF ACL OF RIGHT KNEE USING QUADRICEPS TENDON AUTOGRAFT: Primary | ICD-10-CM

## 2024-07-09 DIAGNOSIS — Z87.39 S/P ARTHROSCOPIC RECONSTRUCTION OF ACL OF RIGHT KNEE USING QUADRICEPS TENDON AUTOGRAFT: Primary | ICD-10-CM

## 2024-07-09 PROCEDURE — 97140 MANUAL THERAPY 1/> REGIONS: CPT | Performed by: PHYSICAL THERAPIST

## 2024-07-11 ENCOUNTER — OFFICE VISIT (OUTPATIENT)
Dept: PHYSICAL THERAPY | Facility: OTHER | Age: 19
End: 2024-07-11
Payer: COMMERCIAL

## 2024-07-11 DIAGNOSIS — Z87.39 S/P ARTHROSCOPIC RECONSTRUCTION OF ACL OF RIGHT KNEE USING QUADRICEPS TENDON AUTOGRAFT: Primary | ICD-10-CM

## 2024-07-11 DIAGNOSIS — Z98.890 S/P ARTHROSCOPIC RECONSTRUCTION OF ACL OF RIGHT KNEE USING QUADRICEPS TENDON AUTOGRAFT: Primary | ICD-10-CM

## 2024-07-11 PROCEDURE — 97140 MANUAL THERAPY 1/> REGIONS: CPT | Performed by: PHYSICAL THERAPIST

## 2024-07-13 NOTE — PROGRESS NOTES
"Daily Note   Today's date: 24  Patient name: Ifeanyi Shin Jr.  : 2005  MRN: [id number]  Referring provider: Tony Snow MD  Dx:   Encounter Diagnosis     ICD-10-CM    1. S/P arthroscopic reconstruction of ACL of left knee using quadriceps tendon autograft  Z98.890     Z87.39         1 on 1 7355-5169  IEP 9628-5826  Start Time: 1600  Stop Time: 1730  Total time in clinic (min): 90 minutes    Subjective: Patient reported at the end of the session: \"I feel like I got a good workout today.\"      Objective: See treatment diary below    Assessment: Tolerated treatment well. PT had patient do a number of exercises today.  He required some verbal and visual cues for proper form with some of the exercises but he was able to complete them without any issues.      Plan: Continue per plan of care.       Precautions: ACL Reconstruction performed on 10/6    Daily Treatment Log  Manuals    GISTM  [facility]   BLLE Stretching [facility]   FR [facility]   TKE Stretching [facility]         Neuro Re-Ed    Planks 3 x 1' ea     Side Planks 3 x 1' ea     Stir the Pot 30x CC/ 30x CW  ABCs 1x      [place] Planks 2 x 1' ea     Pball Crushers  30 x 5\"     Pball Diagonals  20 x 5\" ea    Wall Sits   3 x 1' #35KB   [place] Split Squat Iso   10 x 10\" ea  #25 x 2    Soleus Raises 3 x 10 KB #25  PMB  3 x 10 KB #25  PMB   Tib Ant DF Dark [place] TB 3x30 ea  Dark Pink TB 3x30 ea   Bird Dips  #35KB 30x ea    Tantrums LAQ 3 x 30   Prone HSC 2 x 30   Sup Hip Ext 2 x 30 LAQ 3 x 30   Prone HSC 2 x 30   Sup Hip Ext 2 x 30 LAQ 3 x 30    SLS   On Inv BOSU  KB Hand Offs  30x #15 KB    Thera Ex     Curve 8' 8' 8'   Hip Mobility  Piriformis Stretch   PTB 10x10\"   Quad ER/IR 30x  Hip Add 30x  Piriformis Stretch   PTB 10x10\"   Quad ER/IR 30x  Hip Add 30x  Piriformis Stretch   PTB 10x10\"   Quad ER/IR 30x  Hip Add 30x    Squats Squat Rack  #135  3 x 12 Squat Rack  #135  3 x 12 Squat Rack  #135  3 x " 12   Lunges   BW Walking 3x    Deadlifts #165 3 x 10      LAQ [place] #20  3 x 10 ea [place] #20  3 x 30 ea    Stooly Rides  3 x 1' ea #35KB    HS Sliders Eccentric 30x   MC 30x   Butt Up 30x   Eccentric 30x    Resisted SS Black/Red 5x  Black/Red 5x w/ Bungee  Black/Red 5x    Monster Walks Black/Red 5x  Black/Red 5x Black/Red 5x               Therapeutic Activity 7/2 7/9 7/11   Torque Push/Pull  10x   #300+     Double Band [place]e Walk  5x     Stairs    W/ #25x2 5x    Reactive BOSU Hops   3 x 30 ea

## 2024-07-13 NOTE — PROGRESS NOTES
"Daily Note   Today's date: 2024  Patient name: Ifeanyi Shin Jr.  : 2005  MRN: [id number]  Referring provider: Tony Snow MD  Dx:   Encounter Diagnosis     ICD-10-CM    1. S/P arthroscopic reconstruction of ACL of left knee using quadriceps tendon autograft  Z98.890     Z87.39         IEP 3910-1883  1 on 1 2467-2356  IEP 4962-4477  Start Time:   Stop Time:   Total time in clinic (min): 60 minutes    Subjective: \"I feel good.  I'm not tired.\"      Objective: See treatment diary below    Assessment: Tolerated treatment well.  PT added several new exercises.  Continue to progress patient.  Patient demonstrated fatigue post treatment, exhibited good technique with therapeutic exercises, and would benefit from continued PT    Plan: Continue per plan of care.     Precautions: ACL Reconstruction performed on 10/6    Daily Treatment Log  Manuals     GISTM  GRC GRC    BLLE Stretching GRC GRC    FR GRC GRC    TKE Stretching GR GR          Neuro Re-Ed     Planks 3 x 1' ea     Side Planks 3 x 1' ea     Stir the Pot 30x CC/ 30x CW  ABCs 1x      [place] Planks 2 x 1' ea     Pball Crushers  30 x 5\"     Pball Diagonals  20 x 5\" ea    Wall Sits      [place] Split Squat Iso      Soleus Raises 3 x 10 KB #25  PMB     Tib Ant DF Dark Pink TB 3x30 ea     Bird Dips  #35KB 30x ea    Tantrums LAQ 3 x 30   Prone HSC 2 x 30   Sup Hip Ext 2 x 30 LAQ 3 x 30   Prone HSC 2 x 30   Sup Hip Ext 2 x 30    SLS   On Inv BOSU  KB Hand Offs  30x #15 KB    Thera Ex      Curve 8' 8'    Hip Mobility  Piriformis Stretch   PTB 10x10\"   Quad ER/IR 30x  Hip Add 30x  Piriformis Stretch   PTB 10x10\"   Quad ER/IR 30x  Hip Add 30x     Squats Squat Rack  #135  3 x 12 Squat Rack  #135  3 x 12    Lunges      Deadlifts #165 3 x 10      LAQ [place] #20  3 x 10 ea [place] #20  3 x 30 ea    Stooly Rides  3 x 1' ea #35KB    HS Sliders Eccentric 30x      Resisted SS Black/Red 5x  Black/Red 5x w/ Bungee     Monster Walks " Black/Red 5x  Black/Red 5x                Therapeutic Activity 7/2 7/9    Torque Push/Pull  10x   #300+     Double Band Auguste Walk  5x     Stairs       Reactive BOSU Hops

## 2024-07-15 ENCOUNTER — OFFICE VISIT (OUTPATIENT)
Dept: PHYSICAL THERAPY | Facility: OTHER | Age: 19
End: 2024-07-15
Payer: COMMERCIAL

## 2024-07-15 ENCOUNTER — APPOINTMENT (OUTPATIENT)
Age: 19
End: 2024-07-15
Payer: COMMERCIAL

## 2024-07-15 DIAGNOSIS — Z87.39 S/P ARTHROSCOPIC RECONSTRUCTION OF ACL OF RIGHT KNEE USING QUADRICEPS TENDON AUTOGRAFT: Primary | ICD-10-CM

## 2024-07-15 DIAGNOSIS — Z98.890 S/P ARTHROSCOPIC RECONSTRUCTION OF ACL OF RIGHT KNEE USING QUADRICEPS TENDON AUTOGRAFT: Primary | ICD-10-CM

## 2024-07-15 PROCEDURE — 97140 MANUAL THERAPY 1/> REGIONS: CPT | Performed by: PHYSICAL THERAPIST

## 2024-07-18 ENCOUNTER — OFFICE VISIT (OUTPATIENT)
Dept: PHYSICAL THERAPY | Facility: OTHER | Age: 19
End: 2024-07-18
Payer: COMMERCIAL

## 2024-07-18 DIAGNOSIS — Z98.890 S/P ARTHROSCOPIC RECONSTRUCTION OF ACL OF RIGHT KNEE USING QUADRICEPS TENDON AUTOGRAFT: Primary | ICD-10-CM

## 2024-07-18 DIAGNOSIS — Z87.39 S/P ARTHROSCOPIC RECONSTRUCTION OF ACL OF RIGHT KNEE USING QUADRICEPS TENDON AUTOGRAFT: Primary | ICD-10-CM

## 2024-07-18 PROCEDURE — 97140 MANUAL THERAPY 1/> REGIONS: CPT | Performed by: PHYSICAL THERAPIST

## 2024-07-18 NOTE — PROGRESS NOTES
"Daily Note   Today's date: 07/15/24  Patient name: Ifeanyi Shin Jr.  : 2005  MRN: [id number]  Referring provider: Tony Snow MD  Dx:   Encounter Diagnosis     ICD-10-CM    1. S/P arthroscopic reconstruction of ACL of left knee using quadriceps tendon autograft  Z98.890     Z87.39         1 on 1 8909-5234  IEP 0262-6361  Start Time: 5  Stop Time: 1830  Total time in clinic (min): 75 minutes    Subjective: \"I feel pretty good.  I don't have any pain.  I just need to work on my fitness.\"      Objective: See treatment diary below    Assessment: Tolerated treatment well. PT added several new exercises.  Patient was encouraged by the square cone drills.       Plan: Continue per plan of care.       Precautions: ACL Reconstruction performed on 10/6    Daily Treatment Log  Manuals 7/2 7/9 7/11 7/15   GISTM  [facility]   BLLE Stretching [facility]   FR [facility]   TKE Stretching [facility]          Neuro Re-Ed 7/2 7/9 7/11 7/15   Planks 3 x 1' ea      Side Planks 3 x 1' ea      Stir the Pot 30x CC/ 30x CW  ABCs 1x       [place] Planks 2 x 1' ea      Pball Crushers  30 x 5\"      Pball Diagonals  20 x 5\" ea     Wall Sits   3 x 1' #35KB    [place] Split Squat Iso   10 x 10\" ea  #25 x 2  3 x 10 ea  #25 x 2    Soleus Raises 3 x 10 KB #25  PMB  3 x 10 KB #25  PMB Wall Sit   3 x 10 #25    Tib Ant DF Dark [place] TB 3x30 ea  Dark Pink TB 3x30 ea Dark Pink TB 3x30 ea   Bird Dips  #35KB 30x ea     Tantrums LAQ 3 x 30   Prone HSC 2 x 30   Sup Hip Ext 2 x 30 LAQ 3 x 30   Prone HSC 2 x 30   Sup Hip Ext 2 x 30 LAQ 3 x 30  LAQ 3 x 30   Prone HSC 2 x 30   Sup Hip Ext 2 x 30   SLS   On Inv BOSU  KB Hand Offs  30x #15 KB     Thera Ex  7/2 7/9 7/11 7/15   Curve 8' 8' 8' 8'    Hip Mobility  Piriformis Stretch   PTB 10x10\"   Quad ER/IR 30x  Hip Add 30x  Piriformis Stretch   PTB 10x10\"   Quad ER/IR 30x  Hip Add 30x  Piriformis Stretch   PTB 10x10\"   Quad ER/IR 30x  Hip Add 30x     Squats Squat " Rack  #135  3 x 12 Squat Rack  #135  3 x 12 Squat Rack  #135  3 x 12    Lunges   BW Walking 3x     Deadlifts #165 3 x 10       LAQ [place] #20  3 x 10 ea [place] #20  3 x 30 ea     Stooly Rides  3 x 1' ea #35KB     HS Sliders Eccentric 30x   MC 30x   Butt Up 30x   Eccentric 30x     Resisted SS Black/Red 5x  Black/Red 5x w/ Bungee  Black/Red 5x  Black/Red 5x    Monster Walks Black/Red 5x  Black/Red 5x Black/Red 5x Black/Red 5x                  Therapeutic Activity 7/2 7/9 7/11 7/15   Torque Push/Pull  10x   #300+      Double Band Auguste Walk  5x      Stairs    W/ #25x2 5x  Each Step 5x  Every Other 5x  #25x2    Reactive BOSU Hops   3 x 30 ea    Square Cones    Random 2 x 1'   Hour Glass 1 2x1'   Hour Glass 2 2x1'

## 2024-07-19 NOTE — PROGRESS NOTES
"Daily Note   Today's date: 24  Patient name: Ifeanyi Shin Jr.  : 2005  MRN: [id number]  Referring provider: Tony Snow MD  Dx:   Encounter Diagnosis     ICD-10-CM    1. S/P arthroscopic reconstruction of ACL of left knee using quadriceps tendon autograft  Z98.890     Z87.39         IEP 7453-3030  1 on 1 3576-6830  Start Time:   Stop Time:   Total time in clinic (min): 75 minutes    Subjective: PT and patient spoke about the plan going forward with PT.  He will be attending PT 1x/week starting next week and then coming to the clinic as a fitness patient an additional 2-3x.      Objective: See treatment diary below    Assessment: Tolerated treatment well. PT added Blaze Pods today.  Patient seemed hesitant move quickly and changing direction.  Continue to work on strength.       Plan: Continue per plan of care.       Precautions: ACL Reconstruction performed on 10/6    Daily Treatment Log  Manuals 7/2 7/9 7/11 7/15 7/18   GISTM  [facility]   BLLE Stretching [facility]    FR [facility]    TKE Stretching [facility]            Neuro Re-Ed 7/2 7/9 7/11 7/15 7/18   Planks 3 x 1' ea       Side Planks 3 x 1' ea       Stir the Pot 30x CC/ 30x CW  ABCs 1x        [place] Planks 2 x 1' ea       Pball Crushers  30 x 5\"       Pball Diagonals  20 x 5\" ea      Wall Sits   3 x 1' #35KB     Chinese Split Squat Iso   10 x 10\" ea  #25 x 2  3 x 10 ea  #25 x 2     Soleus Raises 3 x 10 KB #25  PMB  3 x 10 KB #25  PMB Wall Sit   3 x 10 #25     Tib Ant DF Dark [place] TB 3x30 ea  Dark Pink TB 3x30 ea Dark Pink TB 3x30 ea    Bird Dips  #35KB 30x ea      Tantrums LAQ 3 x 30   Prone HSC 2 x 30   Sup Hip Ext 2 x 30 LAQ 3 x 30   Prone HSC 2 x 30   Sup Hip Ext 2 x 30 LAQ 3 x 30  LAQ 3 x 30   Prone HSC 2 x 30   Sup Hip Ext 2 x 30 LAQ 3 x 30   SLS   On Inv BOSU  KB Hand Offs  30x #15 KB      Thera Ex  7/2 7/9 7/11 7/15 7/18   Curve 8' 8' 8' 8'  Bike 8'    Hip Mobility  Piriformis Stretch " "  PTB 10x10\"   Quad ER/IR 30x  Hip Add 30x  Piriformis Stretch   PTB 10x10\"   Quad ER/IR 30x  Hip Add 30x  Piriformis Stretch   PTB 10x10\"   Quad ER/IR 30x  Hip Add 30x      Squats Squat Rack  #135  3 x 12 Squat Rack  #135  3 x 12 Squat Rack  #135  3 x 12     Lunges   BW Walking 3x      Deadlifts #165 3 x 10        LAQ [place] #20  3 x 10 ea [place] #20  3 x 30 ea      Stooly Rides  3 x 1' ea #35KB      HS Sliders Eccentric 30x   MC 30x   Butt Up 30x   Eccentric 30x      Resisted SS Black/Red 5x  Black/Red 5x w/ Bungee  Black/Red 5x  Black/Red 5x  Black/Red 5x    Monster Walks Black/Red 5x  Black/Red 5x Black/Red 5x Black/Red 5x  Black/Red 5x    [place] HS Curls      3 x 8  Green Swiss Ball (GSB)    Pball HS Curls      3 x 10 GSB            Therapeutic Activity 7/2 7/9 7/11 7/15 7/18   Torque Push/Pull  10x   #300+       Double Band Auguste Walk  5x       Stairs    W/ #25x2 5x  Each Step 5x  Every Other 5x  #25x2  Every Step 3x   #50x2    Reactive BOSU Hops   3 x 30 ea     Square Cones    Random 2 x 1'   Hour Glass 1 2x1'   Hour Glass 2 2x1'     Blaze Pods     Square  Hour Glass   Reaction  15'      "

## 2024-07-22 ENCOUNTER — APPOINTMENT (OUTPATIENT)
Age: 19
End: 2024-07-22
Payer: COMMERCIAL

## 2024-07-22 ENCOUNTER — APPOINTMENT (OUTPATIENT)
Dept: PHYSICAL THERAPY | Facility: OTHER | Age: 19
End: 2024-07-22
Payer: COMMERCIAL

## 2024-07-25 ENCOUNTER — APPOINTMENT (OUTPATIENT)
Age: 19
End: 2024-07-25
Payer: COMMERCIAL

## 2024-07-25 ENCOUNTER — APPOINTMENT (OUTPATIENT)
Dept: PHYSICAL THERAPY | Facility: OTHER | Age: 19
End: 2024-07-25
Payer: COMMERCIAL

## 2024-07-26 ENCOUNTER — HOSPITAL ENCOUNTER (EMERGENCY)
Facility: HOSPITAL | Age: 19
Discharge: HOME | End: 2024-07-26
Attending: EMERGENCY MEDICINE
Payer: COMMERCIAL

## 2024-07-26 VITALS
RESPIRATION RATE: 20 BRPM | HEART RATE: 102 BPM | OXYGEN SATURATION: 95 % | DIASTOLIC BLOOD PRESSURE: 75 MMHG | SYSTOLIC BLOOD PRESSURE: 138 MMHG | TEMPERATURE: 97.8 F

## 2024-07-26 DIAGNOSIS — L05.01 PILONIDAL ABSCESS: Primary | ICD-10-CM

## 2024-07-26 PROCEDURE — 99282 EMERGENCY DEPT VISIT SF MDM: CPT

## 2024-07-26 PROCEDURE — 10081 I&D PILONIDAL CYST COMP: CPT | Performed by: EMERGENCY MEDICINE

## 2024-07-26 PROCEDURE — 99284 EMERGENCY DEPT VISIT MOD MDM: CPT | Performed by: EMERGENCY MEDICINE

## 2024-07-26 RX ORDER — IBUPROFEN 600 MG/1
600 TABLET ORAL ONCE
Status: COMPLETED | OUTPATIENT
Start: 2024-07-26 | End: 2024-07-26

## 2024-07-26 RX ORDER — CEPHALEXIN 250 MG/1
500 CAPSULE ORAL ONCE
Status: COMPLETED | OUTPATIENT
Start: 2024-07-26 | End: 2024-07-26

## 2024-07-26 RX ORDER — CEPHALEXIN 500 MG/1
500 CAPSULE ORAL EVERY 8 HOURS SCHEDULED
Qty: 7 CAPSULE | Refills: 0 | Status: SHIPPED | OUTPATIENT
Start: 2024-07-26 | End: 2024-08-02

## 2024-07-26 RX ORDER — ACETAMINOPHEN 325 MG/1
975 TABLET ORAL ONCE
Status: COMPLETED | OUTPATIENT
Start: 2024-07-26 | End: 2024-07-26

## 2024-07-26 RX ADMIN — IBUPROFEN 600 MG: 600 TABLET, FILM COATED ORAL at 19:50

## 2024-07-26 RX ADMIN — ACETAMINOPHEN 975 MG: 325 TABLET, FILM COATED ORAL at 19:50

## 2024-07-26 RX ADMIN — CEPHALEXIN 500 MG: 250 CAPSULE ORAL at 19:50

## 2024-07-26 NOTE — ED PROVIDER NOTES
History  Chief Complaint   Patient presents with    Cyst     Pt states he has a pilonidal abscess. Was seen at PCP for it on Saturday no c/o of worsening pain. Started taking cephalexin yesterday      19-year-old male presents with 2 or 3 days of pain in his gluteal cleft, saw his primary care doctor who said that he had a pilonidal abscess and started him on Keflex instead of his pain continued to follow-up in the emergency department for further management.  The patient reports has had no fever, headache, dizziness, nausea, vomiting, chest pain, cough, shortness of breath, no pain with defecation, no urinary or GI or other complaints.  The patient reports he has not had any bleeding, purulence, or other discharge from the area.        Prior to Admission Medications   Prescriptions Last Dose Informant Patient Reported? Taking?   acetaminophen (TYLENOL) 325 mg tablet   No No   Sig: Take 1 tablet (325 mg total) by mouth every 6 (six) hours as needed for mild pain   Patient not taking: Reported on 7/8/2024   naproxen (Naprosyn) 500 mg tablet   No No   Sig: Take 1 tablet (500 mg total) by mouth 2 (two) times a day with meals   Patient not taking: Reported on 5/10/2024   oxyCODONE (Roxicodone) 5 immediate release tablet   No No   Sig: Take 1 tablet (5 mg total) by mouth every 4 (four) hours as needed for moderate pain for up to 15 doses Max Daily Amount: 30 mg   Patient not taking: Reported on 3/26/2024      Facility-Administered Medications: None       History reviewed. No pertinent past medical history.    Past Surgical History:   Procedure Laterality Date    MN ARTHROSCOPY KNEE REMOVAL LOOSE/FOREIGN BODY Left 3/13/2024    Procedure: ARTHROSCOPY KNEE for lysis of adhesive lesions and debridement,manipulation under anesthesia,;  Surgeon: Deo Alcantar MD;  Location: [facility];  Service: Orthopedics    MN MANIPULATION KNEE JOINT UNDER GENERAL ANESTHESIA Left 3/13/2024    Procedure: MANIPULATION JOINT KNEE;  Surgeon:  Deo Alcantar MD;  Location:  MAIN OR;  Service: Orthopedics    TONSILLECTOMY      TYMPANOSTOMY TUBE PLACEMENT         Family History   Problem Relation Age of Onset    No Known Problems Mother     No Known Problems Father      I have reviewed and agree with the history as documented.    E-Cigarette/Vaping    E-Cigarette Use Never User      E-Cigarette/Vaping Substances    Nicotine No     THC No     CBD No     Flavoring No     Other No      Social History     Tobacco Use    Smoking status: Never    Smokeless tobacco: Never   Vaping Use    Vaping status: Never Used   Substance Use Topics    Alcohol use: Not Currently    Drug use: Never       Review of Systems   Constitutional:  Negative for fever.   HENT:  Negative for congestion.    Eyes:  Negative for visual disturbance.   Respiratory:  Negative for cough and shortness of breath.    Cardiovascular:  Negative for chest pain.   Gastrointestinal:  Negative for abdominal pain, diarrhea and vomiting.   Endocrine: Negative for polyuria.   Genitourinary:  Negative for dysuria and hematuria.   Musculoskeletal:  Negative for myalgias.   Skin:  Positive for wound.   Neurological:  Negative for dizziness and headaches.       Physical Exam  Physical Exam  Vitals and nursing note reviewed.   Constitutional:       General: He is not in acute distress.     Appearance: Normal appearance.   HENT:      Head: Normocephalic and atraumatic.      Right Ear: External ear normal.      Left Ear: External ear normal.      Mouth/Throat:      Mouth: Mucous membranes are moist.      Pharynx: Oropharynx is clear.   Eyes:      Conjunctiva/sclera: Conjunctivae normal.      Pupils: Pupils are equal, round, and reactive to light.   Cardiovascular:      Rate and Rhythm: Normal rate.   Pulmonary:      Effort: Pulmonary effort is normal. No respiratory distress.   Abdominal:      General: Abdomen is flat. There is no distension.   Musculoskeletal:         General: No deformity. Normal range of  motion.      Cervical back: Normal range of motion.   Skin:     General: Skin is warm and dry.      Comments: Patient has an area of fluctuance at the superior gluteal cleft, with erythema, the area of fluctuance appears to track towards the rectum but does not appear to involve the actual anal verge.   Neurological:      General: No focal deficit present.      Mental Status: He is alert and oriented to person, place, and time.   Psychiatric:         Mood and Affect: Mood normal.         Behavior: Behavior normal.         Vital Signs  ED Triage Vitals [07/26/24 1907]   Temperature Pulse Respirations Blood Pressure SpO2   97.8 °F (36.6 °C) (!) 112 20 138/75 95 %      Temp src Heart Rate Source Patient Position - Orthostatic VS BP Location FiO2 (%)   -- Monitor Lying Right arm --      Pain Score       --           Vitals:    07/26/24 1907 07/26/24 1912   BP: 138/75    Pulse: (!) 112 102   Patient Position - Orthostatic VS: Lying          Visual Acuity      ED Medications  Medications   acetaminophen (TYLENOL) tablet 975 mg (975 mg Oral Given 7/26/24 1950)   ibuprofen (MOTRIN) tablet 600 mg (600 mg Oral Given 7/26/24 1950)   cephalexin (KEFLEX) capsule 500 mg (500 mg Oral Given 7/26/24 1950)       Diagnostic Studies  Results Reviewed       None                   No orders to display              Procedures  Incision and drain    Date/Time: 7/26/2024 11:12 PM    Performed by: Danis Ricketts MD  Authorized by: Danis Ricketts MD  Universal Protocol:  Consent: Verbal consent obtained. Written consent not obtained.  Risks and benefits: risks, benefits and alternatives were discussed  Consent given by: patient  Patient identity confirmed: verbally with patient and arm band    Patient location:  ED  Location:     Type:  Abscess    Location:  Anogenital    Anogenital location:  Pilonidal  Pre-procedure details:     Skin preparation:  Antiseptic wash  Anesthesia (see MAR for exact dosages):     Anesthesia  method:  None  Procedure details:     Complexity:  Intermediate    Needle aspiration: no      Incision types:  Single straight    Scalpel blade:  11    Approach:  Open    Incision depth:  Subcutaneous    Wound management:  Probed and deloculated and irrigated with saline    Drainage:  Purulent and bloody    Drainage amount:  Copious    Wound treatment:  Wound left open    Packing materials:  None  Post-procedure details:     Patient tolerance of procedure:  Tolerated well, no immediate complications           ED Course           Medical Decision Making  19-year-old male presents with a pilonidal abscess, the abscess was drained, deloculated, irrigated with saline, significant amounts of purulent discharge with some bloody aspect were drained, and irrigated, the patient already has a prescription for Keflex twice a day, I prescribed the patient an additional 7 tablets in order to increase his dosage to 3 times a day.  The patient has been given gauze for wound dressing, has been instructed on care, return indications and follow-up instructions.  The patient safer discharge home.    Risk  OTC drugs.  Prescription drug management.                 Disposition  Final diagnoses:   Pilonidal abscess     Time reflects when diagnosis was documented in both MDM as applicable and the Disposition within this note       Time User Action Codes Description Comment    7/26/2024  8:06 PM Danis Ricketts Add [L05.01] Pilonidal abscess           ED Disposition       ED Disposition   Discharge    Condition   Stable    Date/Time   Fri Jul 26, 2024  8:06 PM    Comment   Ifeanyi Shin Jr. discharge to home/self care.                   Follow-up Information       Follow up With Specialties Details Why Contact Info Additional Information    Roge Zepeda MD Pediatrics Schedule an appointment as soon as possible for a visit in 3 days For follow-up [address] [place] [address]  [phone]       [facility]  [facility] Emergency Department Emergency Medicine Go to  As needed [address] [facility] [address]  [phone] [facility] Emergency Department, [address], [place], [address]            Discharge Medication List as of 7/26/2024  8:11 PM        START taking these medications    Details   cephalexin (KEFLEX) 500 mg capsule Take 1 capsule (500 mg total) by mouth every 8 (eight) hours for 7 days, Starting Fri 7/26/2024, Until Fri 8/2/2024, Normal           CONTINUE these medications which have NOT CHANGED    Details   acetaminophen (TYLENOL) 325 mg tablet Take 1 tablet (325 mg total) by mouth every 6 (six) hours as needed for mild pain, Starting Wed 3/13/2024, Normal      naproxen (Naprosyn) 500 mg tablet Take 1 tablet (500 mg total) by mouth 2 (two) times a day with meals, Starting Wed 3/13/2024, Normal      oxyCODONE (Roxicodone) 5 immediate release tablet Take 1 tablet (5 mg total) by mouth every 4 (four) hours as needed for moderate pain for up to 15 doses Max Daily Amount: 30 mg, Starting Wed 3/13/2024, Normal             No discharge procedures on file.    PDMP Review       None            ED Provider  Electronically Signed by             Danis Ricketts MD  07/26/24 9283

## 2024-07-29 ENCOUNTER — OFFICE VISIT (OUTPATIENT)
Dept: PHYSICAL THERAPY | Facility: OTHER | Age: 19
End: 2024-07-29

## 2024-07-29 DIAGNOSIS — Z87.39 S/P ARTHROSCOPIC RECONSTRUCTION OF ACL OF RIGHT KNEE USING QUADRICEPS TENDON AUTOGRAFT: Primary | ICD-10-CM

## 2024-07-29 DIAGNOSIS — Z98.890 S/P ARTHROSCOPIC RECONSTRUCTION OF ACL OF RIGHT KNEE USING QUADRICEPS TENDON AUTOGRAFT: Primary | ICD-10-CM

## 2024-07-29 PROCEDURE — 97140 MANUAL THERAPY 1/> REGIONS: CPT | Performed by: PHYSICAL THERAPIST

## 2024-08-07 ENCOUNTER — APPOINTMENT (OUTPATIENT)
Dept: PHYSICAL THERAPY | Facility: OTHER | Age: 19
End: 2024-08-07
Payer: COMMERCIAL

## 2024-08-08 ENCOUNTER — OFFICE VISIT (OUTPATIENT)
Dept: PHYSICAL THERAPY | Facility: OTHER | Age: 19
End: 2024-08-08

## 2024-08-08 DIAGNOSIS — Z87.39 S/P ARTHROSCOPIC RECONSTRUCTION OF ACL OF RIGHT KNEE USING QUADRICEPS TENDON AUTOGRAFT: Primary | ICD-10-CM

## 2024-08-08 DIAGNOSIS — Z98.890 S/P ARTHROSCOPIC RECONSTRUCTION OF ACL OF RIGHT KNEE USING QUADRICEPS TENDON AUTOGRAFT: Primary | ICD-10-CM

## 2024-08-08 PROCEDURE — 97140 MANUAL THERAPY 1/> REGIONS: CPT | Performed by: PHYSICAL THERAPIST

## 2024-08-11 NOTE — PROGRESS NOTES
"Daily Note   Today's date: 24  Patient name: Ifeanyi Shin Jr.  : 2005  MRN: [id number]  Referring provider: Tony Snow MD  Dx:   Encounter Diagnosis     ICD-10-CM    1. S/P arthroscopic reconstruction of ACL of left knee using quadriceps tendon autograft  Z98.890     Z87.39         1 on 1 3436-9951  IEP 8141-1016             Subjective: Patient reports he feels pretty good today.  At the end of the session, patient reported fatigue.      Objective: See treatment diary below    Assessment: Tolerated treatment well. PT had patient do high reps today.  Monitor response NV.  Continue to work on strength.       Plan: Continue per plan of care.       Precautions: ACL Reconstruction performed on 10/6    Daily Treatment Log  Manuals 7/2 7/9 7/11 7/15 7/18 7/29    GISTM  C [facility]    BLLE Stretching [facility]      FR [facility]      TKE Stretching [facility]                Neuro Re-Ed 7/2 7/9 7/11 7/15 7/18 7/29    Planks 3 x 1' ea         Side Planks 3 x 1' ea         Stir the Pot 30x CC/ 30x CW  ABCs 1x          [place] Planks 2 x 1' ea         Pball Crushers  30 x 5\"         Pball Diagonals  20 x 5\" ea        Wall Sits   3 x 1' #35KB   4 x 1' #50     [place] Split Squat Iso   10 x 10\" ea  #25 x 2  3 x 10 ea  #25 x 2       Soleus Raises 3 x 10 KB #25  PMB  3 x 10 KB #25  PMB Wall Sit   3 x 10 #25   Wall Sit   3x30 #50    Tib Ant DF Dark [place] TB 3x30 ea  Dark Pink TB 3x30 ea Dark Pink TB 3x30 ea      Bird Dips  #35KB 30x ea        Tantrums LAQ 3 x 30   Prone HSC 2 x 30   Sup Hip Ext 2 x 30 LAQ 3 x 30   Prone HSC 2 x 30   Sup Hip Ext 2 x 30 LAQ 3 x 30  LAQ 3 x 30   Prone HSC 2 x 30   Sup Hip Ext 2 x 30 LAQ 3 x 30     SLS   On Inv BOSU  KB Hand Offs  30x #15 KB        Thera Ex  7/2 7/9 7/11 7/15 7/18 7/29    Curve 8' 8' 8' 8'  Bike 8'  10'     Hip Mobility  Piriformis Stretch   PTB 10x10\"   Quad ER/IR 30x  Hip Add 30x  Piriformis Stretch   PTB 10x10\"   Quad ER/IR " "30x  Hip Add 30x  Piriformis Stretch   PTB 10x10\"   Quad ER/IR 30x  Hip Add 30x    Piriformis Stretch   PTB 10x10\"   Quad ER/IR 30x  Hip Add 30x     Squats Squat Rack  #135  3 x 12 Squat Rack  #135  3 x 12 Squat Rack  #135  3 x 12       Lunges   BW Walking 3x        Deadlifts #165 3 x 10          LAQ [place] #20  3 x 10 ea [place] #20  3 x 30 ea        Stooly Rides  3 x 1' ea #35KB        HS Sliders Eccentric 30x   MC 30x   Butt Up 30x   Eccentric 30x        Resisted SS Black/Red 5x  Black/Red 5x w/ Bungee  Black/Red 5x  Black/Red 5x  Black/Red 5x      Monster Walks Black/Red 5x  Black/Red 5x Black/Red 5x Black/Red 5x  Black/Red 5x      [place] HS Curls      3 x 8  Green Swiss Ball (GSB)      Pball HS Curls      3 x 10 GSB      6 Way Banded Hips      [place] 100x ea     Therapeutic Activity 7/2 7/9 7/11 7/15 7/18     Torque Push/Pull  10x   #300+         Double Band [place] Walk  5x         Stairs    W/ #25x2 5x  Each Step 5x  Every Other 5x  #25x2  Every Step 3x   #50x2  Every Step 5x   #50x2    Reactive BOSU Hops   3 x 30 ea       Square Cones    Random 2 x 1'   Hour Glass 1 2x1'   Hour Glass 2 2x1'       Blaze Pods     Square  Hour Glass   Reaction  15'        "

## 2024-08-12 ENCOUNTER — OFFICE VISIT (OUTPATIENT)
Dept: PHYSICAL THERAPY | Facility: OTHER | Age: 19
End: 2024-08-12

## 2024-08-12 DIAGNOSIS — Z87.39 S/P ARTHROSCOPIC RECONSTRUCTION OF ACL OF RIGHT KNEE USING QUADRICEPS TENDON AUTOGRAFT: Primary | ICD-10-CM

## 2024-08-12 DIAGNOSIS — Z98.890 S/P ARTHROSCOPIC RECONSTRUCTION OF ACL OF RIGHT KNEE USING QUADRICEPS TENDON AUTOGRAFT: Primary | ICD-10-CM

## 2024-08-12 PROCEDURE — 97140 MANUAL THERAPY 1/> REGIONS: CPT | Performed by: PHYSICAL THERAPIST

## 2024-08-18 NOTE — PROGRESS NOTES
"Daily Note   Today's date: 24  Patient name: Ifeanyi Shin Jr.  : 2005  MRN: [id number]  Referring provider: Tony Snow MD  Dx:   Encounter Diagnosis     ICD-10-CM    1. S/P arthroscopic reconstruction of ACL of left knee using quadriceps tendon autograft  Z98.890     Z87.39         1 on 1 0460-9169  IEP 5261-8805             Subjective: Patient reports he feels pretty good today.  At the end of the session, patient reported fatigue.      Objective: See treatment diary below    Assessment: Tolerated treatment well. PT had patient do high reps today.  Monitor response NV.  Continue to work on strength.       Plan: Continue per plan of care.       Precautions: ACL Reconstruction performed on 10/6    Daily Treatment Log  Manuals 7/2 7/9 7/11 7/15 7/18 7/29    GISTM  C [facility]    BLLE Stretching [facility]      FR [facility]      TKE Stretching [facility]                Neuro Re-Ed 7/2 7/9 7/11 7/15 7/18 7/29    Planks 3 x 1' ea         Side Planks 3 x 1' ea         Stir the Pot 30x CC/ 30x CW  ABCs 1x          [place] Planks 2 x 1' ea         Pball Crushers  30 x 5\"         Pball Diagonals  20 x 5\" ea        Wall Sits   3 x 1' #35KB   4 x 1' #50     [place] Split Squat Iso   10 x 10\" ea  #25 x 2  3 x 10 ea  #25 x 2       Soleus Raises 3 x 10 KB #25  PMB  3 x 10 KB #25  PMB Wall Sit   3 x 10 #25   Wall Sit   3x30 #50    Tib Ant DF Dark [place] TB 3x30 ea  Dark Pink TB 3x30 ea Dark Pink TB 3x30 ea      Bird Dips  #35KB 30x ea        Tantrums LAQ 3 x 30   Prone HSC 2 x 30   Sup Hip Ext 2 x 30 LAQ 3 x 30   Prone HSC 2 x 30   Sup Hip Ext 2 x 30 LAQ 3 x 30  LAQ 3 x 30   Prone HSC 2 x 30   Sup Hip Ext 2 x 30 LAQ 3 x 30     SLS   On Inv BOSU  KB Hand Offs  30x #15 KB        Thera Ex  7/2 7/9 7/11 7/15 7/18 7/29    Curve 8' 8' 8' 8'  Bike 8'  10'     Hip Mobility  Piriformis Stretch   PTB 10x10\"   Quad ER/IR 30x  Hip Add 30x  Piriformis Stretch   PTB 10x10\"   Quad ER/IR " "30x  Hip Add 30x  Piriformis Stretch   PTB 10x10\"   Quad ER/IR 30x  Hip Add 30x    Piriformis Stretch   PTB 10x10\"   Quad ER/IR 30x  Hip Add 30x     Squats Squat Rack  #135  3 x 12 Squat Rack  #135  3 x 12 Squat Rack  #135  3 x 12       Lunges   BW Walking 3x        Deadlifts #165 3 x 10          LAQ [place] #20  3 x 10 ea [place] #20  3 x 30 ea        Stooly Rides  3 x 1' ea #35KB        HS Sliders Eccentric 30x   MC 30x   Butt Up 30x   Eccentric 30x        Resisted SS Black/Red 5x  Black/Red 5x w/ Bungee  Black/Red 5x  Black/Red 5x  Black/Red 5x      Monster Walks Black/Red 5x  Black/Red 5x Black/Red 5x Black/Red 5x  Black/Red 5x      [place] HS Curls      3 x 8  Green Swiss Ball (GSB)      Pball HS Curls      3 x 10 GSB      6 Way Banded Hips      [place] 100x ea     Therapeutic Activity 7/2 7/9 7/11 7/15 7/18     Torque Push/Pull  10x   #300+         Double Band [place] Walk  5x         Stairs    W/ #25x2 5x  Each Step 5x  Every Other 5x  #25x2  Every Step 3x   #50x2  Every Step 5x   #50x2    Reactive BOSU Hops   3 x 30 ea       Square Cones    Random 2 x 1'   Hour Glass 1 2x1'   Hour Glass 2 2x1'       Blaze Pods     Square  Hour Glass   Reaction  15'        "

## 2024-08-29 ENCOUNTER — OFFICE VISIT (OUTPATIENT)
Dept: PHYSICAL THERAPY | Facility: OTHER | Age: 19
End: 2024-08-29

## 2024-08-29 DIAGNOSIS — Z98.890 S/P ARTHROSCOPIC RECONSTRUCTION OF ACL OF RIGHT KNEE USING QUADRICEPS TENDON AUTOGRAFT: Primary | ICD-10-CM

## 2024-08-29 DIAGNOSIS — Z87.39 S/P ARTHROSCOPIC RECONSTRUCTION OF ACL OF RIGHT KNEE USING QUADRICEPS TENDON AUTOGRAFT: Primary | ICD-10-CM

## 2024-08-29 PROCEDURE — 97140 MANUAL THERAPY 1/> REGIONS: CPT | Performed by: PHYSICAL THERAPIST

## 2024-09-01 NOTE — PROGRESS NOTES
"Daily Note   Today's date: 24  Patient name: Ifeanyi Shin Jr.  : 2005  MRN: [id number]  Referring provider: Tony Snow MD  Dx:   Encounter Diagnosis     ICD-10-CM    1. S/P arthroscopic reconstruction of ACL of left knee using quadriceps tendon autograft  Z98.890     Z87.39         1 on 1 2294-5945  IEP 4025-3465             Subjective: Patient reports he feels pretty good today.  At the end of the session, patient reported fatigue.      Objective: See treatment diary below    Assessment: Tolerated treatment well. PT had patient do high reps today.  Monitor response NV.  Continue to work on strength.       Plan: Continue per plan of care.       Precautions: ACL Reconstruction performed on 10/6    Daily Treatment Log  Manuals 7/2 7/9 7/11 7/15 7/18 7/29    GISTM  C [facility]    BLLE Stretching [facility]      FR [facility]      TKE Stretching [facility]                Neuro Re-Ed 7/2 7/9 7/11 7/15 7/18 7/29    Planks 3 x 1' ea         Side Planks 3 x 1' ea         Stir the Pot 30x CC/ 30x CW  ABCs 1x          [place] Planks 2 x 1' ea         Pball Crushers  30 x 5\"         Pball Diagonals  20 x 5\" ea        Wall Sits   3 x 1' #35KB   4 x 1' #50     [place] Split Squat Iso   10 x 10\" ea  #25 x 2  3 x 10 ea  #25 x 2       Soleus Raises 3 x 10 KB #25  PMB  3 x 10 KB #25  PMB Wall Sit   3 x 10 #25   Wall Sit   3x30 #50    Tib Ant DF Dark [place] TB 3x30 ea  Dark Pink TB 3x30 ea Dark Pink TB 3x30 ea      Bird Dips  #35KB 30x ea        Tantrums LAQ 3 x 30   Prone HSC 2 x 30   Sup Hip Ext 2 x 30 LAQ 3 x 30   Prone HSC 2 x 30   Sup Hip Ext 2 x 30 LAQ 3 x 30  LAQ 3 x 30   Prone HSC 2 x 30   Sup Hip Ext 2 x 30 LAQ 3 x 30     SLS   On Inv BOSU  KB Hand Offs  30x #15 KB        Thera Ex  7/2 7/9 7/11 7/15 7/18 7/29    Curve 8' 8' 8' 8'  Bike 8'  10'     Hip Mobility  Piriformis Stretch   PTB 10x10\"   Quad ER/IR 30x  Hip Add 30x  Piriformis Stretch   PTB 10x10\"   Quad ER/IR " "30x  Hip Add 30x  Piriformis Stretch   PTB 10x10\"   Quad ER/IR 30x  Hip Add 30x    Piriformis Stretch   PTB 10x10\"   Quad ER/IR 30x  Hip Add 30x     Squats Squat Rack  #135  3 x 12 Squat Rack  #135  3 x 12 Squat Rack  #135  3 x 12       Lunges   BW Walking 3x        Deadlifts #165 3 x 10          LAQ [place] #20  3 x 10 ea [place] #20  3 x 30 ea        Stooly Rides  3 x 1' ea #35KB        HS Sliders Eccentric 30x   MC 30x   Butt Up 30x   Eccentric 30x        Resisted SS Black/Red 5x  Black/Red 5x w/ Bungee  Black/Red 5x  Black/Red 5x  Black/Red 5x      Monster Walks Black/Red 5x  Black/Red 5x Black/Red 5x Black/Red 5x  Black/Red 5x      [place] HS Curls      3 x 8  Green Swiss Ball (GSB)      Pball HS Curls      3 x 10 GSB      6 Way Banded Hips      [place] 100x ea     Therapeutic Activity 7/2 7/9 7/11 7/15 7/18     Torque Push/Pull  10x   #300+         Double Band [place] Walk  5x         Stairs    W/ #25x2 5x  Each Step 5x  Every Other 5x  #25x2  Every Step 3x   #50x2  Every Step 5x   #50x2    Reactive BOSU Hops   3 x 30 ea       Square Cones    Random 2 x 1'   Hour Glass 1 2x1'   Hour Glass 2 2x1'       Blaze Pods     Square  Hour Glass   Reaction  15'        "

## 2024-09-05 ENCOUNTER — OFFICE VISIT (OUTPATIENT)
Dept: PHYSICAL THERAPY | Facility: OTHER | Age: 19
End: 2024-09-05

## 2024-09-05 DIAGNOSIS — Z98.890 S/P ARTHROSCOPIC RECONSTRUCTION OF ACL OF RIGHT KNEE USING QUADRICEPS TENDON AUTOGRAFT: Primary | ICD-10-CM

## 2024-09-05 DIAGNOSIS — Z87.39 S/P ARTHROSCOPIC RECONSTRUCTION OF ACL OF RIGHT KNEE USING QUADRICEPS TENDON AUTOGRAFT: Primary | ICD-10-CM

## 2024-09-05 PROCEDURE — 97140 MANUAL THERAPY 1/> REGIONS: CPT | Performed by: PHYSICAL THERAPIST

## 2024-09-08 NOTE — PROGRESS NOTES
"Daily Note   Today's date: 24  Patient name: Ifeanyi Shin Jr.  : 2005  MRN: [id number]  Referring provider: Tony Snow MD  Dx:   Encounter Diagnosis     ICD-10-CM    1. S/P arthroscopic reconstruction of ACL of left knee using quadriceps tendon autograft  Z98.890     Z87.39         1 on 1 8198-5143  IEP 2584-5455             Subjective: Patient reports he feels pretty good today.  At the end of the session, patient reported fatigue.      Objective: See treatment diary below    Assessment: Tolerated treatment well. PT had patient do high reps today.  Monitor response NV.  Continue to work on strength.       Plan: Continue per plan of care.       Precautions: ACL Reconstruction performed on 10/6    Daily Treatment Log  Manuals 7/2 7/9 7/11 7/15 7/18 7/29    GISTM  C [facility]    BLLE Stretching [facility]      FR [facility]      TKE Stretching [facility]                Neuro Re-Ed 7/2 7/9 7/11 7/15 7/18 7/29    Planks 3 x 1' ea         Side Planks 3 x 1' ea         Stir the Pot 30x CC/ 30x CW  ABCs 1x          [place] Planks 2 x 1' ea         Pball Crushers  30 x 5\"         Pball Diagonals  20 x 5\" ea        Wall Sits   3 x 1' #35KB   4 x 1' #50     [place] Split Squat Iso   10 x 10\" ea  #25 x 2  3 x 10 ea  #25 x 2       Soleus Raises 3 x 10 KB #25  PMB  3 x 10 KB #25  PMB Wall Sit   3 x 10 #25   Wall Sit   3x30 #50    Tib Ant DF Dark [place] TB 3x30 ea  Dark Pink TB 3x30 ea Dark Pink TB 3x30 ea      Bird Dips  #35KB 30x ea        Tantrums LAQ 3 x 30   Prone HSC 2 x 30   Sup Hip Ext 2 x 30 LAQ 3 x 30   Prone HSC 2 x 30   Sup Hip Ext 2 x 30 LAQ 3 x 30  LAQ 3 x 30   Prone HSC 2 x 30   Sup Hip Ext 2 x 30 LAQ 3 x 30     SLS   On Inv BOSU  KB Hand Offs  30x #15 KB        Thera Ex  7/2 7/9 7/11 7/15 7/18 7/29    Curve 8' 8' 8' 8'  Bike 8'  10'     Hip Mobility  Piriformis Stretch   PTB 10x10\"   Quad ER/IR 30x  Hip Add 30x  Piriformis Stretch   PTB 10x10\"   Quad ER/IR " "30x  Hip Add 30x  Piriformis Stretch   PTB 10x10\"   Quad ER/IR 30x  Hip Add 30x    Piriformis Stretch   PTB 10x10\"   Quad ER/IR 30x  Hip Add 30x     Squats Squat Rack  #135  3 x 12 Squat Rack  #135  3 x 12 Squat Rack  #135  3 x 12       Lunges   BW Walking 3x        Deadlifts #165 3 x 10          LAQ [place] #20  3 x 10 ea [place] #20  3 x 30 ea        Stooly Rides  3 x 1' ea #35KB        HS Sliders Eccentric 30x   MC 30x   Butt Up 30x   Eccentric 30x        Resisted SS Black/Red 5x  Black/Red 5x w/ Bungee  Black/Red 5x  Black/Red 5x  Black/Red 5x      Monster Walks Black/Red 5x  Black/Red 5x Black/Red 5x Black/Red 5x  Black/Red 5x      [place] HS Curls      3 x 8  Green Swiss Ball (GSB)      Pball HS Curls      3 x 10 GSB      6 Way Banded Hips      [place] 100x ea     Therapeutic Activity 7/2 7/9 7/11 7/15 7/18     Torque Push/Pull  10x   #300+         Double Band [place] Walk  5x         Stairs    W/ #25x2 5x  Each Step 5x  Every Other 5x  #25x2  Every Step 3x   #50x2  Every Step 5x   #50x2    Reactive BOSU Hops   3 x 30 ea       Square Cones    Random 2 x 1'   Hour Glass 1 2x1'   Hour Glass 2 2x1'       Blaze Pods     Square  Hour Glass   Reaction  15'        "

## 2024-09-09 ENCOUNTER — EVALUATION (OUTPATIENT)
Age: 19
End: 2024-09-09

## 2024-09-09 DIAGNOSIS — Z87.39 S/P ARTHROSCOPIC RECONSTRUCTION OF ACL OF RIGHT KNEE USING QUADRICEPS TENDON AUTOGRAFT: Primary | ICD-10-CM

## 2024-09-09 DIAGNOSIS — Z98.890 S/P ARTHROSCOPIC RECONSTRUCTION OF ACL OF RIGHT KNEE USING QUADRICEPS TENDON AUTOGRAFT: Primary | ICD-10-CM

## 2024-09-09 PROCEDURE — 97140 MANUAL THERAPY 1/> REGIONS: CPT | Performed by: PHYSICAL THERAPIST

## 2024-09-12 NOTE — PROGRESS NOTES
PT Re-Evaluation   Today's date: 2024  Patient name: Ifeanyi Shin Jr.  : 2005  MRN: [id number]  Referring provider: Tony Snow MD  Dx:   Encounter Diagnosis     ICD-10-CM    1. S/P arthroscopic reconstruction of ACL of left knee using quadriceps tendon autograft  Z98.890     Z87.39         Start Time: 1500  Stop Time: 1545  Total time in clinic (min): 45 minutes    Assessment  Assessment details: Ifeanyi Shin Jr. is a 18 y.o. male who presents with complaints of  S/P arthroscopic reconstruction of ACL of left knee using quadriceps tendon autograft  (primary encounter diagnosis).  No further referral appears necessary at this time based upon examination results.  Patient presents to PT with impaired strength, impaired ROM, decreased flexibility and impaired ability to complete IADLs.  Prognosis is good given HEP compliance and PT 2-3x/wk.  Positive prognostic indicators include positive attitude toward recovery.   Please contact me if you have any questions or recommendations.  Thank you for the opportunity to share in  Ifeanyi's care.       Impairments: abnormal coordination, abnormal muscle firing, abnormal or restricted ROM, activity intolerance, impaired physical strength, lacks appropriate home exercise program, pain with function and poor body mechanics    Symptom irritability: moderateBarriers to therapy: Restricted ROM due to scar tissue   Understanding of Dx/Px/POC: good   Prognosis: good    Goals  Short Term:  Pt will report decreased levels of pain by at least 2 subjective ratings in 4 weeks- MET  Pt will demonstrate improved ROM by at least 10 degrees in 4 weeks- MET  Pt will demonstrate improved strength by 1/2 grade- PARTIALLY MET  Long Term:   Pt will be independent in their HEP in 8 weeks- MET  Pt will be be pain free with IADL's-PARTIALLY MET  Pt will demonstrate improved FOTO, > 80- MET    Plan  Plan details: Prognosis above is given PT services 2x/week tapering to  "1x/week over the next 3 months and home program adherence.  Patient would benefit from: skilled physical therapy  Planned modality interventions: thermotherapy: hydrocollator packs, electrical stimulation/Russian stimulation, cryotherapy and low level laser therapy  Planned therapy interventions: activity modification, joint mobilization, manual therapy, motor coordination training, neuromuscular re-education, patient education, self care, therapeutic activities, therapeutic exercise, graded activity, home exercise program, abdominal trunk stabilization, balance, IASTM, flexibility, functional ROM exercises, strengthening and stretching  Frequency: 2x week  Duration in weeks: 12  Plan of Care beginning date: 9/9/2024  Plan of Care expiration date: 12/9/2024  Treatment plan discussed with: patient    Subjective Evaluation    History of Present Illness  Mechanism of injury: Patient is attending PT 1x/week at this time.  He is working out on his own as well as with a .  He is motivated and demonstrating significant improvement since starting PT.  Patient denies any pain at this time but he does still present with some weakness in the quads.    Pain   R Knee   Current 0/10  At Best 0/10  At Worst 2/10  Patient described the pain as occasional tightness.    AGGS: stretching   EASES: rest, stretching   Patient's Goal: \"I want to play football in college.\"     Objective     General Comments:    Knee Comments  LQS: WNL     Palpation: unremarkable     Observation: surgical site has healed nicely     ROM   PROM   R Knee WNL   L Knee:   Flexion 135*   Extension 0*     Strength   Iliopsoas L 5/5 R 5/5   ER L 5/5 R 5/5   IR L 5/5 R 5/5   PGM L 5/5 R 5/5  Glute Max L 5/5 R 5/5   Quads L 5/5 R 5/5   Hamstrings L 5/5 R 5/5     Return To Play Testing:   Isokinetic Testing  Patient had a L ACL reconstruction performed . Isokinetic strength testing was performed today. These results will give a comparison of the L and R " hamstrings as well as the L and R quadriceps. Testing parameters used were 60*/sec from 100-30 degrees.  The quadricep ratio= 47% (involved/univolved).  The hamstring ratio= 140% (involved/uninvolved).  The hamstring to quad ratio= 105% (involved hamstring/involved quadriceps).  Patient reported no pain with testing. PT discussed the results with the patient. PT provided a copy to the patient and a copy for the referring surgeon to aid in the decision on returning to sport.  Thank you for your time.       Patient had a L ACL reconstruction performed . Isokinetic strength testing was performed today. These results will give a comparison of the L and R hamstrings as well as the L and R quadriceps. Testing parameters used were 180*/sec from 100-30 degrees.  The quadricep ratio= 63% (involved/univolved).  The hamstring ratio= 149% (involved/uninvolved).  The hamstring to quad ratio= 100% (involved hamstring/involved quadriceps).  Patient reported no pain with testing. PT discussed the results with the patient. PT provided a copy to the patient and a copy for the referring surgeon to aid in the decision on returning to sport.  Thank you for your time.       Precautions: history of L ACL Reconstruction on 10/6/2023

## 2024-09-16 ENCOUNTER — OFFICE VISIT (OUTPATIENT)
Age: 19
End: 2024-09-16

## 2024-09-16 DIAGNOSIS — Z98.890 S/P ARTHROSCOPIC RECONSTRUCTION OF ACL OF RIGHT KNEE USING QUADRICEPS TENDON AUTOGRAFT: Primary | ICD-10-CM

## 2024-09-16 DIAGNOSIS — Z87.39 S/P ARTHROSCOPIC RECONSTRUCTION OF ACL OF RIGHT KNEE USING QUADRICEPS TENDON AUTOGRAFT: Primary | ICD-10-CM

## 2024-09-16 PROCEDURE — 97140 MANUAL THERAPY 1/> REGIONS: CPT | Performed by: PHYSICAL THERAPIST

## 2024-09-16 NOTE — PROGRESS NOTES
Daily Note   Today's date: 2024  Patient name: Ifeanyi Shin Jr.  : 2005  MRN: [id number]  Referring provider: Tony Snow MD  Dx:   Encounter Diagnosis     ICD-10-CM    1. S/P arthroscopic reconstruction of ACL of left knee using quadriceps tendon autograft  Z98.890     Z87.39         Start Time: 0830  Stop Time: 0900  Total time in clinic (min): 30 minutes    Subjective: PT and patient discussed a plan going forward.  On , PT and patient will focus on recovery.  On , patient will focus on explosiveness; On , patient will focus on core; On , patient will focus on lifting and on  he will be taking a rest day.      Objective: See treatment diary below    Assessment: Tolerated treatment well.  PT Patient demonstrated fatigue post treatment, exhibited good technique with therapeutic exercises, and would benefit from continued PT    Plan: Continue per plan of care.     Precautions: Status Post ACL Reconstruction     Daily Treatment Log   Manuals        GISTM  GRC       FR GRC       Piriformis Stretch w/ TB GRC       BLLE Stretching GRC       Neuro Re-Ed         BFR                                                         Ther Ex        Hip Add Hip Mobility  30x ea       Hip ER/IR Quadruped Hip Mobility 30x ea                                                       Ther Activity                        Gait Training                        Modalities

## 2024-09-17 ENCOUNTER — APPOINTMENT (OUTPATIENT)
Age: 19
End: 2024-09-17

## 2024-09-23 ENCOUNTER — OFFICE VISIT (OUTPATIENT)
Age: 19
End: 2024-09-23

## 2024-09-23 DIAGNOSIS — Z98.890 S/P ARTHROSCOPIC RECONSTRUCTION OF ACL OF RIGHT KNEE USING QUADRICEPS TENDON AUTOGRAFT: Primary | ICD-10-CM

## 2024-09-23 DIAGNOSIS — Z87.39 S/P ARTHROSCOPIC RECONSTRUCTION OF ACL OF RIGHT KNEE USING QUADRICEPS TENDON AUTOGRAFT: Primary | ICD-10-CM

## 2024-09-23 PROCEDURE — 97140 MANUAL THERAPY 1/> REGIONS: CPT | Performed by: PHYSICAL THERAPIST

## 2024-09-30 ENCOUNTER — OFFICE VISIT (OUTPATIENT)
Age: 19
End: 2024-09-30

## 2024-09-30 DIAGNOSIS — Z87.39 S/P ARTHROSCOPIC RECONSTRUCTION OF ACL OF RIGHT KNEE USING QUADRICEPS TENDON AUTOGRAFT: Primary | ICD-10-CM

## 2024-09-30 DIAGNOSIS — Z98.890 S/P ARTHROSCOPIC RECONSTRUCTION OF ACL OF RIGHT KNEE USING QUADRICEPS TENDON AUTOGRAFT: Primary | ICD-10-CM

## 2024-09-30 PROCEDURE — 97140 MANUAL THERAPY 1/> REGIONS: CPT | Performed by: PHYSICAL THERAPIST

## 2024-10-06 NOTE — PROGRESS NOTES
"Daily Note   Today's date: 2024  Patient name: Ifeanyi Shin Jr.  : 2005  MRN: [id number]  Referring provider: Tony Snow MD  Dx:   Encounter Diagnosis     ICD-10-CM    1. S/P arthroscopic reconstruction of ACL of left knee using quadriceps tendon autograft  Z98.890     Z87.39         1 on 1 [id number]  IEP [id number]  Start Time: 1230  Stop Time: 1330  Total time in clinic (min): 60 minutes    Subjective: Patient reports no new issues at this time.  He said he is feeling stronger every day.  PT will be performing RTP Testing in the very near future.      Objective: See treatment diary below    Assessment: Tolerated treatment well.  On , PT and patient will focus on recovery.  On , patient will focus on explosiveness; On , patient will focus on core; On , patient will focus on lifting and on  he will be taking a rest day.  The aforementioned plan is tentative depending on patient attendance.  PT administered BFR today because he was unable to attend any other sessions last week.  Patient demonstrated fatigue post treatment, exhibited good technique with therapeutic exercises, and would benefit from continued PT    Plan: Continue per plan of care.     Precautions: Status Post ACL Reconstruction     Daily Treatment Log   Manuals      GISTM  GR GRC GRC     FR GRC GR GRC     Piriformis Stretch w/ TB GR GR GRC     BLLE Stretching [facility] GRC     Neuro Re-Ed       BFR    Double K' Ext  Single K' Ext  Double K' Flex  Single K' Flex  SL Squats  Resisted SS                                                      Ther Ex      TM   10'      Hip Add Hip Mobility  30x ea 30x ea      Hip ER/IR Quadruped Hip Mobility 30x ea 30x ea      KB Hip Lunge w/ Glute Squeeze  #25K   10 x 10\" ea                                              Ther Activity      Agility Ladder  3x ea including SL Hops and Hop Scotch       Box " "Jumps  SL 12\" 3x10 ea  DL 24\" 3x10 ea  Obstacle 3x       Bungee Sprints  5x       Jump Rope   3 x 30              Gait Training                        Modalities                          "

## 2024-10-07 ENCOUNTER — APPOINTMENT (OUTPATIENT)
Age: 19
End: 2024-10-07

## 2024-10-08 ENCOUNTER — OFFICE VISIT (OUTPATIENT)
Age: 19
End: 2024-10-08

## 2024-10-08 DIAGNOSIS — Z98.890 S/P ARTHROSCOPIC RECONSTRUCTION OF ACL OF RIGHT KNEE USING QUADRICEPS TENDON AUTOGRAFT: Primary | ICD-10-CM

## 2024-10-08 DIAGNOSIS — Z87.39 S/P ARTHROSCOPIC RECONSTRUCTION OF ACL OF RIGHT KNEE USING QUADRICEPS TENDON AUTOGRAFT: Primary | ICD-10-CM

## 2024-10-08 PROCEDURE — 97140 MANUAL THERAPY 1/> REGIONS: CPT | Performed by: PHYSICAL THERAPIST

## 2024-10-13 NOTE — PROGRESS NOTES
"Daily Note   Today's date: 2024  Patient name: Ifeanyi Shin Jr.  : 2005  MRN: [id number]  Referring provider: Tony Snow MD  Dx:   Encounter Diagnosis     ICD-10-CM    1. S/P arthroscopic reconstruction of ACL of left knee using quadriceps tendon autograft  Z98.890     Z87.39         1 on 1 [id number]  IEP [id number]             Subjective: Patient reports no new issues at this time.  He said he is feeling stronger every day.  PT will be performing RTP Testing in the very near future.      Objective: See treatment diary below    Assessment: Tolerated treatment well.  On , PT and patient will focus on recovery.  On , patient will focus on explosiveness; On , patient will focus on core; On , patient will focus on lifting and on  he will be taking a rest day.  The aforementioned plan is tentative depending on patient attendance.  PT administered BFR today because he was unable to attend any other sessions last week.  Patient demonstrated fatigue post treatment, exhibited good technique with therapeutic exercises, and would benefit from continued PT    Plan: Continue per plan of care.     Precautions: Status Post ACL Reconstruction     Daily Treatment Log   Manuals      GISTM  [facility] GR     FR [facility]     [facility]     BLLE Stretching [facility]     Neuro Re-Ed       BFR    Double K' Ext  Single K' Ext  Double K' Flex  Single K' Flex  SL Squats  Resisted SS                                                      Ther Ex      TM   10'      Hip Add Hip Mobility  30x ea 30x ea      Hip ER/IR Quadruped Hip Mobility 30x ea 30x ea      KB Hip Lunge w/ Glute Squeeze  #25K   10 x 10\" ea                                              Ther Activity      Agility Ladder  3x ea including SL Hops and Hop Scotch       Box Jumps  SL 12\" 3x10 ea  DL 24\" 3x10 ea  Obstacle 3x       Bungee " Sprints  5x       Jump Rope   3 x 30              Gait Training                        Modalities

## 2024-10-13 NOTE — PROGRESS NOTES
"Daily Note   Today's date: 10/7/2024  Patient name: Ifeanyi Shin Jr.  : 2005  MRN: [id number]  Referring provider: Tony Snow MD  Dx:   Encounter Diagnosis     ICD-10-CM    1. S/P arthroscopic reconstruction of ACL of left knee using quadriceps tendon autograft  Z98.890     Z87.39         Start Time: 1000  Stop Time: 1100  Total time in clinic (min): 60 minutes    Subjective: Patient reports minimal knee pain after jumping today.  PT and patient were working on explosiveness.  PT believes his pain may be due to the number of reps that were performed.       Objective: See treatment diary below    Assessment: Tolerated treatment well.  PT focused on explosive exercises today.  Patient demonstrated fatigue post treatment, exhibited good technique with therapeutic exercises, and would benefit from continued PT    Plan: Continue per plan of care.     Precautions: Status Post ACL Reconstruction     Daily Treatment Log   Manuals 9/16 9/23 9/30 10/7     [facility]  [facility]     FR [facility]     Piriformis Stretch w/ TB GRC [facility] GR     BLLE Stretching [facility]     Neuro Re-Ed  9/16 9/23 9/30 10/7    BFR    Double K' Ext  Single K' Ext  Double K' Flex  Single K' Flex  SL Squats  Resisted SS                                                      Ther Ex 9/16 9/23 9/30 10/7    TM   10'      Hip Add Hip Mobility  30x ea 30x ea      Hip ER/IR Quadruped Hip Mobility 30x ea 30x ea      KB Hip Lunge w/ Glute Squeeze  #25K   10 x 10\" ea                                              Ther Activity      Agility Ladder  3x ea including SL Hops and Hop Scotch       Box Jumps  SL 12\" 3x10 ea  DL 24\" 3x10 ea  Obstacle 3x       Bungee Sprints  5x       Jump Rope   3 x 30              Gait Training                        Modalities                          "

## 2024-10-14 ENCOUNTER — APPOINTMENT (OUTPATIENT)
Age: 19
End: 2024-10-14

## 2024-10-15 ENCOUNTER — APPOINTMENT (OUTPATIENT)
Age: 19
End: 2024-10-15

## 2024-10-16 ENCOUNTER — OFFICE VISIT (OUTPATIENT)
Age: 19
End: 2024-10-16

## 2024-10-16 DIAGNOSIS — Z87.39 S/P ARTHROSCOPIC RECONSTRUCTION OF ACL OF RIGHT KNEE USING QUADRICEPS TENDON AUTOGRAFT: Primary | ICD-10-CM

## 2024-10-16 DIAGNOSIS — Z98.890 S/P ARTHROSCOPIC RECONSTRUCTION OF ACL OF RIGHT KNEE USING QUADRICEPS TENDON AUTOGRAFT: Primary | ICD-10-CM

## 2024-10-16 PROCEDURE — 97140 MANUAL THERAPY 1/> REGIONS: CPT | Performed by: PHYSICAL THERAPIST

## 2024-10-19 NOTE — PROGRESS NOTES
"Daily Note   Today's date: 10/7/2024  Patient name: Ifeanyi Shin Jr.  : 2005  MRN: [id number]  Referring provider: Tony Snow MD  Dx:   Encounter Diagnosis     ICD-10-CM    1. S/P arthroscopic reconstruction of ACL of left knee using quadriceps tendon autograft  Z98.890     Z87.39                    Subjective: Patient reports minimal knee pain after jumping today.  PT and patient were working on explosiveness.  PT believes his pain may be due to the number of reps that were performed.       Objective: See treatment diary below    Assessment: Tolerated treatment well.  PT focused on explosive exercises today.  Patient demonstrated fatigue post treatment, exhibited good technique with therapeutic exercises, and would benefit from continued PT    Plan: Continue per plan of care.     Precautions: Status Post ACL Reconstruction     Daily Treatment Log   Manuals 9/16 9/23 9/30 10/7     GISTM  GRC GRC GRC     FR GRC GRC GRC     Piriformis Stretch w/ TB GRC GRC GRC     BLLE Stretching GRC GRC GRC     Neuro Re-Ed  9/16 9/23 9/30 10/7    BFR    Double K' Ext  Single K' Ext  Double K' Flex  Single K' Flex  SL Squats  Resisted SS                                                      Ther Ex 9/16 9/23 9/30 10/7    TM   10'      Hip Add Hip Mobility  30x ea 30x ea      Hip ER/IR Quadruped Hip Mobility 30x ea 30x ea      KB Hip Lunge w/ Glute Squeeze  #25K   10 x 10\" ea                                              Ther Activity      Agility Ladder  3x ea including SL Hops and Hop Scotch       Box Jumps  SL 12\" 3x10 ea  DL 24\" 3x10 ea  Obstacle 3x       Bungee Sprints  5x       Jump Rope   3 x 30              Gait Training                        Modalities                          "

## 2024-10-21 ENCOUNTER — OFFICE VISIT (OUTPATIENT)
Age: 19
End: 2024-10-21

## 2024-10-21 DIAGNOSIS — Z87.39 S/P ARTHROSCOPIC RECONSTRUCTION OF ACL OF RIGHT KNEE USING QUADRICEPS TENDON AUTOGRAFT: Primary | ICD-10-CM

## 2024-10-21 DIAGNOSIS — Z98.890 S/P ARTHROSCOPIC RECONSTRUCTION OF ACL OF RIGHT KNEE USING QUADRICEPS TENDON AUTOGRAFT: Primary | ICD-10-CM

## 2024-10-21 PROCEDURE — 97140 MANUAL THERAPY 1/> REGIONS: CPT | Performed by: PHYSICAL THERAPIST

## 2024-10-24 ENCOUNTER — APPOINTMENT (OUTPATIENT)
Age: 19
End: 2024-10-24

## 2024-10-27 NOTE — PROGRESS NOTES
"Daily Note   Today's date: 10/7/2024  Patient name: Ifeanyi Shin Jr.  : 2005  MRN: [id number]  Referring provider: Tony Snow MD  Dx:   Encounter Diagnosis     ICD-10-CM    1. S/P arthroscopic reconstruction of ACL of left knee using quadriceps tendon autograft  Z98.890     Z87.39                    Subjective: Patient reports minimal knee pain after jumping today.  PT and patient were working on explosiveness.  PT believes his pain may be due to the number of reps that were performed.       Objective: See treatment diary below    Assessment: Tolerated treatment well.  PT focused on explosive exercises today.  Patient demonstrated fatigue post treatment, exhibited good technique with therapeutic exercises, and would benefit from continued PT    Plan: Continue per plan of care.     Precautions: Status Post ACL Reconstruction     Daily Treatment Log   Manuals 9/16 9/23 9/30 10/7     GISTM  GRC GRC GRC     FR GRC GRC GRC     Piriformis Stretch w/ TB GRC GRC GRC     BLLE Stretching GRC GRC GRC     Neuro Re-Ed  9/16 9/23 9/30 10/7    BFR    Double K' Ext  Single K' Ext  Double K' Flex  Single K' Flex  SL Squats  Resisted SS                                                      Ther Ex 9/16 9/23 9/30 10/7    TM   10'      Hip Add Hip Mobility  30x ea 30x ea      Hip ER/IR Quadruped Hip Mobility 30x ea 30x ea      KB Hip Lunge w/ Glute Squeeze  #25K   10 x 10\" ea                                              Ther Activity      Agility Ladder  3x ea including SL Hops and Hop Scotch       Box Jumps  SL 12\" 3x10 ea  DL 24\" 3x10 ea  Obstacle 3x       Bungee Sprints  5x       Jump Rope   3 x 30              Gait Training                        Modalities                          "

## 2024-10-28 ENCOUNTER — OFFICE VISIT (OUTPATIENT)
Age: 19
End: 2024-10-28

## 2024-10-28 DIAGNOSIS — Z87.39 S/P ARTHROSCOPIC RECONSTRUCTION OF ACL OF RIGHT KNEE USING QUADRICEPS TENDON AUTOGRAFT: Primary | ICD-10-CM

## 2024-10-28 DIAGNOSIS — Z98.890 S/P ARTHROSCOPIC RECONSTRUCTION OF ACL OF RIGHT KNEE USING QUADRICEPS TENDON AUTOGRAFT: Primary | ICD-10-CM

## 2024-10-28 PROCEDURE — 97140 MANUAL THERAPY 1/> REGIONS: CPT | Performed by: PHYSICAL THERAPIST

## 2024-10-31 ENCOUNTER — APPOINTMENT (OUTPATIENT)
Age: 19
End: 2024-10-31

## 2024-11-01 ENCOUNTER — OFFICE VISIT (OUTPATIENT)
Dept: OBGYN CLINIC | Facility: CLINIC | Age: 19
End: 2024-11-01
Payer: COMMERCIAL

## 2024-11-01 VITALS
DIASTOLIC BLOOD PRESSURE: 75 MMHG | SYSTOLIC BLOOD PRESSURE: 112 MMHG | BODY MASS INDEX: 27.92 KG/M2 | WEIGHT: 195 LBS | HEART RATE: 67 BPM | HEIGHT: 70 IN

## 2024-11-01 DIAGNOSIS — M25.662 KNEE STIFFNESS, LEFT: ICD-10-CM

## 2024-11-01 DIAGNOSIS — Z98.890 STATUS POST ANTERIOR CRUCIATE LIGAMENT SURGERY: Primary | ICD-10-CM

## 2024-11-01 DIAGNOSIS — M24.662 FIBROSIS OF LEFT KNEE JOINT: ICD-10-CM

## 2024-11-01 PROCEDURE — 99213 OFFICE O/P EST LOW 20 MIN: CPT | Performed by: ORTHOPAEDIC SURGERY

## 2024-11-01 NOTE — PROGRESS NOTES
Subjective   CHIEF COMPLAINT/REASON FOR VISIT  Ifeanyi Shin Jr. is a 19 y.o. male who presents for  4 month post op follow-up after ARTHROSCOPY KNEE for lysis of adhesive lesions and debridement,manipulation under anesthesia, - Left and Manipulation Joint Knee - Left on 3/13/2024     HISTORY OF PRESENT ILLNESS  Overall, he feels things are going well and progressing appropriately. He has been following the post-operative rehabilitation regimen without difficultly. Currently, he is doing well without any specific concerns.  He is continue to work with physical therapy.  Patient's original ACL surgery was October 6, 2023.  He wonders when he is supposed to be obtaining his custom ACL brace.    Objective   PHYSICAL EXAMINATION  Musculoskeletal: left Knee Examination:  General: The patient is alert, oriented, and pleasant to interact with.  Patient ambulates with Normal gait pattern  Assistive Device: No  Alignment: normal  Skin is warm and dry to touch with no signs of erythema, ecchymosis, or infection   Effusion: none  ROM: 0° - 135°    MMT: 5/5 throughout  TTP: None  Flexor and extensor mechanisms are intact   Knee is stable to varus and valgus stress  Du's Test Negative    Lachman's Test - 1A  2+ DP and PT pulses with brisk capillary refill to the toes  Sural, saphenous, tibial, superficial, and deep peroneal motor and sensory distributions intact  Sensation light touch intact distally    Assessment & Plan   1. 7 months Status Post ARTHROSCOPY KNEE for lysis of adhesive lesions and debridement,manipulation under anesthesia, - Left and Manipulation Joint Knee - Left    He is doing well after surgery and making appropriate progress. Encouraged to continue working on quadriceps strength with PT, at this time his wife tested single-leg hop test in the office demonstrate approximately 90% of his overall strength and functional compared to contralateral leg.  He may progress to all activities as tolerated  without restrictions.  At this point, he is cleared to proceed with measurements for custom fit ACL brace to be worn as desired for activity, for which an order was placed today.  Patient is a candidate and will need a functional ACL brace for instability.      We will plan to see him back around fall 2024. If any issues, questions, or concerns arise between now and the next appointment, we have encouraged the patient contact our team.    Scribe Attestation      I,:  Penny Schroeder am acting as a scribe while in the presence of the attending physician.:       I,:  Deo Alcantar MD personally performed the services described in this documentation    as scribed in my presence.:

## 2024-11-03 NOTE — PROGRESS NOTES
"Daily Note   Today's date: 10/7/2024  Patient name: Ifeanyi Shin Jr.  : 2005  MRN: [id number]  Referring provider: Tony Snow MD  Dx:   Encounter Diagnosis     ICD-10-CM    1. S/P arthroscopic reconstruction of ACL of left knee using quadriceps tendon autograft  Z98.890     Z87.39                    Subjective: Patient reports minimal knee pain after jumping today.  PT and patient were working on explosiveness.  PT believes his pain may be due to the number of reps that were performed.       Objective: See treatment diary below    Assessment: Tolerated treatment well.  PT focused on explosive exercises today.  Patient demonstrated fatigue post treatment, exhibited good technique with therapeutic exercises, and would benefit from continued PT    Plan: Continue per plan of care.     Precautions: Status Post ACL Reconstruction     Daily Treatment Log   Manuals 9/16 9/23 9/30 10/7     GISTM  GRC GRC GRC     FR GRC GRC GRC     Piriformis Stretch w/ TB GRC GRC GRC     BLLE Stretching GRC GRC GRC     Neuro Re-Ed  9/16 9/23 9/30 10/7    BFR    Double K' Ext  Single K' Ext  Double K' Flex  Single K' Flex  SL Squats  Resisted SS                                                      Ther Ex 9/16 9/23 9/30 10/7    TM   10'      Hip Add Hip Mobility  30x ea 30x ea      Hip ER/IR Quadruped Hip Mobility 30x ea 30x ea      KB Hip Lunge w/ Glute Squeeze  #25K   10 x 10\" ea                                              Ther Activity      Agility Ladder  3x ea including SL Hops and Hop Scotch       Box Jumps  SL 12\" 3x10 ea  DL 24\" 3x10 ea  Obstacle 3x       Bungee Sprints  5x       Jump Rope   3 x 30              Gait Training                        Modalities                          "

## 2024-11-05 ENCOUNTER — OFFICE VISIT (OUTPATIENT)
Age: 19
End: 2024-11-05

## 2024-11-05 DIAGNOSIS — Z98.890 S/P ARTHROSCOPIC RECONSTRUCTION OF ACL OF RIGHT KNEE USING QUADRICEPS TENDON AUTOGRAFT: Primary | ICD-10-CM

## 2024-11-05 DIAGNOSIS — Z87.39 S/P ARTHROSCOPIC RECONSTRUCTION OF ACL OF RIGHT KNEE USING QUADRICEPS TENDON AUTOGRAFT: Primary | ICD-10-CM

## 2024-11-05 PROCEDURE — 97140 MANUAL THERAPY 1/> REGIONS: CPT | Performed by: PHYSICAL THERAPIST

## 2024-11-10 NOTE — PROGRESS NOTES
"Daily Note   Today's date: 10/7/2024  Patient name: Ifeanyi Shin Jr.  : 2005  MRN: [id number]  Referring provider: Tony Snow MD  Dx:   Encounter Diagnosis     ICD-10-CM    1. S/P arthroscopic reconstruction of ACL of left knee using quadriceps tendon autograft  Z98.890     Z87.39                    Subjective: Patient reports minimal knee pain after jumping today.  PT and patient were working on explosiveness.  PT believes his pain may be due to the number of reps that were performed.       Objective: See treatment diary below    Assessment: Tolerated treatment well.  PT focused on explosive exercises today.  Patient demonstrated fatigue post treatment, exhibited good technique with therapeutic exercises, and would benefit from continued PT    Plan: Continue per plan of care.     Precautions: Status Post ACL Reconstruction     Daily Treatment Log   Manuals 9/16 9/23 9/30 10/7     GISTM  GRC GRC GRC     FR GRC GRC GRC     Piriformis Stretch w/ TB GRC GRC GRC     BLLE Stretching GRC GRC GRC     Neuro Re-Ed  9/16 9/23 9/30 10/7    BFR    Double K' Ext  Single K' Ext  Double K' Flex  Single K' Flex  SL Squats  Resisted SS                                                      Ther Ex 9/16 9/23 9/30 10/7    TM   10'      Hip Add Hip Mobility  30x ea 30x ea      Hip ER/IR Quadruped Hip Mobility 30x ea 30x ea      KB Hip Lunge w/ Glute Squeeze  #25K   10 x 10\" ea                                              Ther Activity      Agility Ladder  3x ea including SL Hops and Hop Scotch       Box Jumps  SL 12\" 3x10 ea  DL 24\" 3x10 ea  Obstacle 3x       Bungee Sprints  5x       Jump Rope   3 x 30              Gait Training                        Modalities                          "

## 2024-12-02 ENCOUNTER — OFFICE VISIT (OUTPATIENT)
Age: 19
End: 2024-12-02

## 2024-12-02 ENCOUNTER — TELEPHONE (OUTPATIENT)
Age: 19
End: 2024-12-02

## 2024-12-02 DIAGNOSIS — Z98.890 S/P ARTHROSCOPIC RECONSTRUCTION OF ACL OF RIGHT KNEE USING QUADRICEPS TENDON AUTOGRAFT: Primary | ICD-10-CM

## 2024-12-02 DIAGNOSIS — Z87.39 S/P ARTHROSCOPIC RECONSTRUCTION OF ACL OF RIGHT KNEE USING QUADRICEPS TENDON AUTOGRAFT: Primary | ICD-10-CM

## 2024-12-02 PROCEDURE — 97140 MANUAL THERAPY 1/> REGIONS: CPT | Performed by: PHYSICAL THERAPIST

## 2024-12-02 NOTE — TELEPHONE ENCOUNTER
Caller: Sushila    Doctor: Katharine    Reason for call: Patients mom is requesting a note from the doctor that he is cleared and he is allowed to play football. Please call Sushila when letter is completed so she can pick it up. Thank you!     Call back#: [phone]

## 2024-12-08 NOTE — PROGRESS NOTES
"Daily Note   Today's date: 10/7/2024  Patient name: Ifeanyi Shin Jr.  : 2005  MRN: [id number]  Referring provider: Tony Snow MD  Dx:   No diagnosis found.               Subjective: Patient reports minimal knee pain after jumping today.  PT and patient were working on explosiveness.  PT believes his pain may be due to the number of reps that were performed.       Objective: See treatment diary below    Assessment: Tolerated treatment well.  PT focused on explosive exercises today.  Patient demonstrated fatigue post treatment, exhibited good technique with therapeutic exercises, and would benefit from continued PT    Plan: Continue per plan of care.     Precautions: Status Post ACL Reconstruction     Daily Treatment Log   Manuals 9/16 9/23 9/30 10/7     [facility]IST[facility]  [facility]     [facility]     [facility]     BLLE Stretching [facility]     Neuro Re-Ed  9/16 9/23 9/30 10/7    BFR    Double K' Ext  Single K' Ext  Double K' Flex  Single K' Flex  SL Squats  Resisted SS                                                      Ther Ex 9/16 9/23 9/30 10/7    TM   10'      Hip Add Hip Mobility  30x ea 30x ea      Hip ER/IR Quadruped Hip Mobility 30x ea 30x ea      KB Hip Lunge w/ Glute Squeeze  #25K   10 x 10\" ea                                              Ther Activity      Agility Ladder  3x ea including SL Hops and Hop Scotch       Box Jumps  SL 12\" 3x10 ea  DL 24\" 3x10 ea  Obstacle 3x       Bungee Sprints  5x       Jump Rope   3 x 30              Gait Training                        Modalities                          "

## 2024-12-09 ENCOUNTER — OFFICE VISIT (OUTPATIENT)
Age: 19
End: 2024-12-09

## 2024-12-09 DIAGNOSIS — Z87.39 S/P ARTHROSCOPIC RECONSTRUCTION OF ACL OF RIGHT KNEE USING QUADRICEPS TENDON AUTOGRAFT: Primary | ICD-10-CM

## 2024-12-09 DIAGNOSIS — Z98.890 S/P ARTHROSCOPIC RECONSTRUCTION OF ACL OF RIGHT KNEE USING QUADRICEPS TENDON AUTOGRAFT: Primary | ICD-10-CM

## 2024-12-09 PROCEDURE — 97140 MANUAL THERAPY 1/> REGIONS: CPT | Performed by: PHYSICAL THERAPIST

## 2024-12-11 ENCOUNTER — OFFICE VISIT (OUTPATIENT)
Dept: PHYSICAL THERAPY | Facility: OTHER | Age: 19
End: 2024-12-11

## 2024-12-11 DIAGNOSIS — Z87.39 S/P ARTHROSCOPIC RECONSTRUCTION OF ACL OF RIGHT KNEE USING QUADRICEPS TENDON AUTOGRAFT: Primary | ICD-10-CM

## 2024-12-11 DIAGNOSIS — Z98.890 S/P ARTHROSCOPIC RECONSTRUCTION OF ACL OF RIGHT KNEE USING QUADRICEPS TENDON AUTOGRAFT: Primary | ICD-10-CM

## 2024-12-12 NOTE — PROGRESS NOTES
"Daily Note   Today's date: 10/7/2024  Patient name: Ifeanyi Shin Jr.  : 2005  MRN: [id number]  Referring provider: Tony Snow MD  Dx:   Encounter Diagnosis     ICD-10-CM    1. S/P arthroscopic reconstruction of ACL of left knee using quadriceps tendon autograft  Z98.890     Z87.39         Independent entire session  Start Time: 1446  Stop Time: 1501  Total time in clinic (min): 15 minutes    Subjective: Patient asking to work on cardio in BOOST TM so he can improve his speed & endurance.    Objective: See treatment diary below    Assessment: Tolerated treatment well focused on BOOST TM jogging & sprint intervals @ 75% BW with no complaints other than cardiovascular fatigue.  Patient demonstrated fatigue post treatment, exhibited good technique with therapeutic exercises, and would benefit from continued PT    Plan: Continue per plan of care.     Precautions: Status Post ACL Reconstruction     Daily Treatment Log   Manuals 9/16 9/23 9/30 10/7  12/12   [facility]     [facility]     [facility]     BL[facility] Stretching [facility]     Neuro Re-Ed  9/16 9/23 9/30 10/7    BFR    Double K' Ext  Single K' Ext  Double K' Flex  Single K' Flex  SL Squats  Resisted SS                                                      Ther Ex 9/16 9/23 9/30 10/7    TM   10'      Hip Add Hip Mobility  30x ea 30x ea      Hip ER/IR Quadruped Hip Mobility 30x ea 30x ea      KB Hip Lunge w/ Glute Squeeze  #25K   10 x 10\" ea                                              Ther Activity      Agility Ladder  3x ea including SL Hops and Hop Scotch       Box Jumps  SL 12\" 3x10 ea  DL 24\" 3x10 ea  Obstacle 3x       Bungee Sprints  5x       Jump Rope   3 x 30              Gait Training        BOOST     75% bw, L  Jog x8 min  WJS 60:45:15 x10    28 min total           Modalities                          "

## 2024-12-14 NOTE — PROGRESS NOTES
"Daily Note   Today's date: 10/7/2024  Patient name: Ifeanyi Shin Jr.  : 2005  MRN: [id number]  Referring provider: Tony Snow MD  Dx:   Encounter Diagnosis     ICD-10-CM    1. S/P arthroscopic reconstruction of ACL of left knee using quadriceps tendon autograft  Z98.890     Z87.39                      Subjective: Patient reports minimal knee pain after jumping today.  PT and patient were working on explosiveness.  PT believes his pain may be due to the number of reps that were performed.       Objective: See treatment diary below    Assessment: Tolerated treatment well.  PT focused on explosive exercises today.  Patient demonstrated fatigue post treatment, exhibited good technique with therapeutic exercises, and would benefit from continued PT    Plan: Continue per plan of care.     Precautions: Status Post ACL Reconstruction     Daily Treatment Log   Manuals 9/16 9/23 9/30 10/7     GISTM  GRC GRC GRC     FR GRC GRC GRC     Piriformis Stretch w/ TB GRC GRC GRC     BLLE Stretching GRC GRC GRC     Neuro Re-Ed  9/16 9/23 9/30 10/7    BFR    Double K' Ext  Single K' Ext  Double K' Flex  Single K' Flex  SL Squats  Resisted SS                                                      Ther Ex 9/16 9/23 9/30 10/7    TM   10'      Hip Add Hip Mobility  30x ea 30x ea      Hip ER/IR Quadruped Hip Mobility 30x ea 30x ea      KB Hip Lunge w/ Glute Squeeze  #25K   10 x 10\" ea                                              Ther Activity      Agility Ladder  3x ea including SL Hops and Hop Scotch       Box Jumps  SL 12\" 3x10 ea  DL 24\" 3x10 ea  Obstacle 3x       Bungee Sprints  5x       Jump Rope   3 x 30              Gait Training                        Modalities                          "

## 2024-12-17 ENCOUNTER — APPOINTMENT (OUTPATIENT)
Age: 19
End: 2024-12-17

## 2025-03-29 NOTE — PROGRESS NOTES
"Daily Note   Today's date: 2024  Patient name: Ifeanyi Shin Jr.  : 2005  MRN: [id number]  Referring provider: Tony Snow MD  Dx:   Encounter Diagnosis     ICD-10-CM    1. S/P arthroscopic reconstruction of ACL of left knee using quadriceps tendon autograft  Z98.890     Z87.39         Start Time: 1500  Stop Time: 1545  Total time in clinic (min): 45 minutes    Subjective: Patient reports no new issues at this time.  He said he is feeling stronger every day.      Objective: See treatment diary below    Assessment: Tolerated treatment well.  On , PT and patient will focus on recovery.  On , patient will focus on explosiveness; On , patient will focus on core; On , patient will focus on lifting and on  he will be taking a rest day.  PT Patient demonstrated fatigue post treatment, exhibited good technique with therapeutic exercises, and would benefit from continued PT    Plan: Continue per plan of care.     Precautions: Status Post ACL Reconstruction     Daily Treatment Log   Manuals       GISTM  GRC GRC      FR GRC GRC      Piriformis Stretch w/ TB GRC GRC      BLLE Stretching GRC GRC      Neuro Re-Ed        BFR                                                         Ther Ex       Hip Add Hip Mobility  30x ea 30x ea      Hip ER/IR Quadruped Hip Mobility 30x ea 30x ea      KB Hip Lunge w/ Glute Squeeze  #25K   10 x 10\" ea                                              Ther Activity       Agility Ladder  3x ea including SL Hops and Hop Scotch       Box Jumps  SL 12\" 3x10 ea  DL 24\" 3x10 ea  Obstacle 3x       Bungee Sprints  5x       Jump Rope   3 x 30              Gait Training                        Modalities                          "
No

## 2025-06-22 ENCOUNTER — HOSPITAL ENCOUNTER (EMERGENCY)
Facility: HOSPITAL | Age: 20
Discharge: HOME | End: 2025-06-23
Attending: EMERGENCY MEDICINE | Admitting: EMERGENCY MEDICINE
Payer: COMMERCIAL

## 2025-06-22 VITALS
TEMPERATURE: 98 F | DIASTOLIC BLOOD PRESSURE: 70 MMHG | RESPIRATION RATE: 16 BRPM | OXYGEN SATURATION: 99 % | SYSTOLIC BLOOD PRESSURE: 130 MMHG | HEIGHT: 71 IN | BODY MASS INDEX: 26.6 KG/M2 | WEIGHT: 190 LBS | HEART RATE: 72 BPM

## 2025-06-22 DIAGNOSIS — M25.511 RIGHT SHOULDER PAIN: ICD-10-CM

## 2025-06-22 DIAGNOSIS — M54.2 NECK PAIN, MUSCULOSKELETAL: ICD-10-CM
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
## 2025-06-22 PROCEDURE — 99284 EMERGENCY DEPT VISIT MOD MDM: CPT

## 2025-06-22 PROCEDURE — 96372 THER/PROPH/DIAG INJ SC/IM: CPT

## 2025-06-22 RX ORDER — KETOROLAC TROMETHAMINE 30 MG/ML
15 INJECTION, SOLUTION INTRAMUSCULAR; INTRAVENOUS ONCE
Status: COMPLETED | OUTPATIENT
Start: 2025-06-22 | End: 2025-06-22

## 2025-06-22 RX ORDER — ACETAMINOPHEN 325 MG/1
975 TABLET ORAL ONCE
Status: COMPLETED | OUTPATIENT
Start: 2025-06-22 | End: 2025-06-22

## 2025-06-22 RX ADMIN — ACETAMINOPHEN 975 MG: 325 TABLET, FILM COATED ORAL at 23:51

## 2025-06-22 RX ADMIN — KETOROLAC TROMETHAMINE 15 MG: 30 INJECTION, SOLUTION INTRAMUSCULAR; INTRAVENOUS at 23:51

## 2025-06-22 NOTE — Clinical Note
Ifeanyi Shin was seen and treated in our emergency department on 6/22/2025.    No restrictions            Diagnosis:     Ifeanyi  may return to work on return date.    He may return on this date: 06/24/2025         If you have any questions or concerns, please don't hesitate to call.      Mark Constantino PA-C    ______________________________           _______________          _______________  Hospital Representative                              Date                                Time

## 2025-06-22 NOTE — Clinical Note
Ifeanyi Shin was seen and treated in our emergency department on 6/22/2025.    No restrictions            Diagnosis:     Ifeanyi  may return to work on return date.    He may return on this date: 06/25/2025         If you have any questions or concerns, please don't hesitate to call.      Mark Constantino PA-C    ______________________________           _______________          _______________  Hospital Representative                              Date                                Time

## 2025-06-23 ENCOUNTER — APPOINTMENT (EMERGENCY)
Dept: RADIOLOGY | Facility: HOSPITAL | Age: 20
End: 2025-06-23
Payer: COMMERCIAL

## 2025-06-23 PROCEDURE — 99284 EMERGENCY DEPT VISIT MOD MDM: CPT

## 2025-06-23 PROCEDURE — 73030 X-RAY EXAM OF SHOULDER: CPT

## 2025-06-23 RX ORDER — CYCLOBENZAPRINE HCL 10 MG
10 TABLET ORAL 2 TIMES DAILY PRN
Qty: 20 TABLET | Refills: 0 | Status: SHIPPED | OUTPATIENT
Start: 2025-06-23

## 2025-06-23 RX ORDER — CYCLOBENZAPRINE HCL 10 MG
10 TABLET ORAL ONCE
Status: DISCONTINUED | OUTPATIENT
Start: 2025-06-23 | End: 2025-06-23

## 2025-06-23 NOTE — DISCHARGE INSTRUCTIONS
Tylenol 650 every 6 hours or Tylenol 1,000 mg every 8 hours as needed for pain.  Ibuprofen(Advil/Motrin) 400-600 mg every 6 hours as needed for pain.   Flexeril as prescribed.  Follow up with PCP as needed.  Return to ER with worsening pain, chest pain, shortness of breath, abdominal pain, or any other concerning symptoms.
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
## 2025-07-16 ENCOUNTER — OFFICE VISIT (OUTPATIENT)
Age: 20
End: 2025-07-16
Payer: COMMERCIAL

## 2025-07-16 DIAGNOSIS — M54.50 ACUTE BILATERAL LOW BACK PAIN WITHOUT SCIATICA: ICD-10-CM
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
## 2025-07-16 DIAGNOSIS — G89.29 CHRONIC PAIN OF LEFT KNEE: ICD-10-CM

## 2025-07-16 DIAGNOSIS — M25.562 CHRONIC PAIN OF LEFT KNEE: ICD-10-CM

## 2025-07-16 PROCEDURE — 97140 MANUAL THERAPY 1/> REGIONS: CPT | Performed by: PHYSICAL THERAPIST

## 2025-07-16 PROCEDURE — 97112 NEUROMUSCULAR REEDUCATION: CPT | Performed by: PHYSICAL THERAPIST

## 2025-07-16 PROCEDURE — 97162 PT EVAL MOD COMPLEX 30 MIN: CPT | Performed by: PHYSICAL THERAPIST
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
## 2025-07-23 ENCOUNTER — OFFICE VISIT (OUTPATIENT)
Age: 20
End: 2025-07-23
Payer: COMMERCIAL

## 2025-07-23 DIAGNOSIS — G89.29 CHRONIC PAIN OF LEFT KNEE: ICD-10-CM

## 2025-07-23 DIAGNOSIS — M25.562 CHRONIC PAIN OF LEFT KNEE: ICD-10-CM

## 2025-07-23 DIAGNOSIS — M54.50 ACUTE BILATERAL LOW BACK PAIN WITHOUT SCIATICA: ICD-10-CM
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
## 2025-07-23 PROCEDURE — 97140 MANUAL THERAPY 1/> REGIONS: CPT | Performed by: PHYSICAL THERAPIST

## 2025-07-23 PROCEDURE — 97110 THERAPEUTIC EXERCISES: CPT | Performed by: PHYSICAL THERAPIST

## 2025-07-23 PROCEDURE — 97112 NEUROMUSCULAR REEDUCATION: CPT | Performed by: PHYSICAL THERAPIST
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
## 2025-07-30 ENCOUNTER — OFFICE VISIT (OUTPATIENT)
Age: 20
End: 2025-07-30
Payer: COMMERCIAL

## 2025-07-30 DIAGNOSIS — G89.29 CHRONIC PAIN OF LEFT KNEE: ICD-10-CM
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
## 2025-07-30 DIAGNOSIS — M25.562 CHRONIC PAIN OF LEFT KNEE: ICD-10-CM

## 2025-07-30 DIAGNOSIS — M54.50 ACUTE BILATERAL LOW BACK PAIN WITHOUT SCIATICA: ICD-10-CM

## 2025-07-30 PROCEDURE — 97112 NEUROMUSCULAR REEDUCATION: CPT

## 2025-07-30 PROCEDURE — 97110 THERAPEUTIC EXERCISES: CPT

## (undated) DEVICE — TUBING ARTHROSCOPIC WAVE  MAIN PUMP

## (undated) DEVICE — GLOVE SRG BIOGEL 7.5

## (undated) DEVICE — BLADE SHAVER DISSECTOR 4MM 13CM COOLCUT

## (undated) DEVICE — CUFF TOURNIQUET 30 X 4 IN QUICK CONNECT DISP 1BLA

## (undated) DEVICE — WEBRIL 6 IN UNSTERILE

## (undated) DEVICE — GLOVE INDICATOR PI UNDERGLOVE SZ 8 BLUE

## (undated) DEVICE — TIBURON SPLIT SHEET: Brand: CONVERTORS

## (undated) DEVICE — OCCLUSIVE GAUZE STRIP,3% BISMUTH TRIBROMOPHENATE IN PETROLATUM BLEND: Brand: XEROFORM

## (undated) DEVICE — GAUZE SPONGES,16 PLY: Brand: CURITY

## (undated) DEVICE — INTENDED FOR TISSUE SEPARATION, AND OTHER PROCEDURES THAT REQUIRE A SHARP SURGICAL BLADE TO PUNCTURE OR CUT.: Brand: BARD-PARKER ® CARBON RIB-BACK BLADES

## (undated) DEVICE — COBAN 6 IN STERILE

## (undated) DEVICE — TUBING SUCTION 5MM X 12 FT

## (undated) DEVICE — DRAPE SHEET THREE QUARTER

## (undated) DEVICE — PADDING CAST 6IN COTTON STRL

## (undated) DEVICE — SURGICAL GOWN, XL SMARTSLEEVE: Brand: CONVERTORS

## (undated) DEVICE — IMPERVIOUS STOCKINETTE: Brand: DEROYAL

## (undated) DEVICE — ABDOMINAL PAD: Brand: DERMACEA

## (undated) DEVICE — ARTHROSCOPY FLOOR MAT

## (undated) DEVICE — ACE WRAP 6 IN UNSTERILE

## (undated) DEVICE — SUT ETHILON 3-0 PS-1 18 IN 1663G

## (undated) DEVICE — PROBE ABLATION  APOLLO RF 90 DEG MULTI PORT

## (undated) DEVICE — CHLORAPREP HI-LITE 26ML ORANGE

## (undated) DEVICE — BETHLEHEM UNIVERSAL  ARTHRO PK: Brand: CARDINAL HEALTH

## (undated) DEVICE — 3M™ STERI-DRAPE™ U-DRAPE 1015: Brand: STERI-DRAPE™